# Patient Record
Sex: MALE | Race: BLACK OR AFRICAN AMERICAN | Employment: UNEMPLOYED | URBAN - METROPOLITAN AREA
[De-identification: names, ages, dates, MRNs, and addresses within clinical notes are randomized per-mention and may not be internally consistent; named-entity substitution may affect disease eponyms.]

---

## 2018-05-18 ENCOUNTER — HOSPITAL ENCOUNTER (EMERGENCY)
Facility: CLINIC | Age: 23
Discharge: HOME | End: 2018-05-18
Attending: EMERGENCY MEDICINE | Admitting: EMERGENCY MEDICINE
Payer: COMMERCIAL

## 2018-05-18 ENCOUNTER — APPOINTMENT (OUTPATIENT)
Dept: GENERAL RADIOLOGY | Facility: CLINIC | Age: 23
End: 2018-05-18
Attending: EMERGENCY MEDICINE
Payer: COMMERCIAL

## 2018-05-18 ENCOUNTER — APPOINTMENT (OUTPATIENT)
Dept: CT IMAGING | Facility: CLINIC | Age: 23
End: 2018-05-18
Attending: EMERGENCY MEDICINE
Payer: COMMERCIAL

## 2018-05-18 VITALS
SYSTOLIC BLOOD PRESSURE: 138 MMHG | RESPIRATION RATE: 16 BRPM | OXYGEN SATURATION: 98 % | DIASTOLIC BLOOD PRESSURE: 57 MMHG | HEART RATE: 89 BPM | TEMPERATURE: 99.3 F | WEIGHT: 166.13 LBS

## 2018-05-18 DIAGNOSIS — T07.XXXA MULTIPLE CONTUSIONS: ICD-10-CM

## 2018-05-18 DIAGNOSIS — S00.03XA HEMATOMA OF SCALP, INITIAL ENCOUNTER: ICD-10-CM

## 2018-05-18 DIAGNOSIS — S63.642A SPRAIN OF METACARPOPHALANGEAL (MCP) JOINT OF LEFT THUMB, INITIAL ENCOUNTER: ICD-10-CM

## 2018-05-18 PROCEDURE — 99284 EMERGENCY DEPT VISIT MOD MDM: CPT | Mod: Z6 | Performed by: EMERGENCY MEDICINE

## 2018-05-18 PROCEDURE — 73130 X-RAY EXAM OF HAND: CPT | Mod: LT

## 2018-05-18 PROCEDURE — 99284 EMERGENCY DEPT VISIT MOD MDM: CPT | Mod: 25

## 2018-05-18 PROCEDURE — 29125 APPL SHORT ARM SPLINT STATIC: CPT

## 2018-05-18 PROCEDURE — 70450 CT HEAD/BRAIN W/O DYE: CPT

## 2018-05-18 ASSESSMENT — ENCOUNTER SYMPTOMS
NECK PAIN: 0
MYALGIAS: 1
VOMITING: 0

## 2018-05-18 NOTE — DISCHARGE INSTRUCTIONS
Please make an appointment to follow up with our concussion clinic ([phone]) or Your Primary Care Provider in one week for recheck.    Avoid recurrent head injury including contact sports for 1 week.    Wear the thumb splint for the next 5 days.    Please make an appointment to follow up with Your Primary Care Provider and Sports Medicine (phone: [phone]) to recheck your thumb in 5-7 days unless symptoms completely resolve.

## 2018-05-18 NOTE — ED AVS SNAPSHOT
[facility], [place], Emergency Department    [address]    [place] [address]    Phone:  [phone]    Fax:  [phone]                                       Hernandez Arajuo   MRN: [id number]    Department:  [facility], Emergency Department   Date of Visit:  5/18/2018           After Visit Summary Signature Page     I have received my discharge instructions, and my questions have been answered. I have discussed any challenges I see with this plan with the nurse or doctor.    ..........................................................................................................................................  Patient/Patient Representative Signature      ..........................................................................................................................................  Patient Representative Print Name and Relationship to Patient    ..................................................               ................................................  Date                                            Time    ..........................................................................................................................................  Reviewed by Signature/Title    ...................................................              ..............................................  Date                                                            Time

## 2018-05-18 NOTE — LETTER
May 18, 2018      To Whom It May Concern:      Hernandez Araujo was seen in our Emergency Department today, 05/18/18.  I expect his condition to improve over the next week.  He may return to work/school on 5/19/18 but will have a splint on his left hand hitting its use and should not participate in contact sports for 1 week.    Sincerely,        Dandy Solis MD, MD

## 2018-05-18 NOTE — ED AVS SNAPSHOT
[facility], Emergency Department    [address]    [place] [address]    Phone:  [phone]    Fax:  [phone]                                       Hernandez Araujo   MRN: [id number]    Department:  [facility], Emergency Department   Date of Visit:  5/18/2018           Patient Information     Date Of Birth          1995        Your diagnoses for this visit were:     Hematoma of scalp, initial encounter     Sprain of metacarpophalangeal (MCP) joint of left thumb, initial encounter     Multiple contusions        You were seen by Dandy Solis MD.        Discharge Instructions       Please make an appointment to follow up with our concussion clinic ([phone]) or Your Primary Care Provider in one week for recheck.    Avoid recurrent head injury including contact sports for 1 week.    Wear the thumb splint for the next 5 days.    Please make an appointment to follow up with Your Primary Care Provider and Sports Medicine (phone: [phone]) to recheck your thumb in 5-7 days unless symptoms completely resolve.    Discharge References/Attachments     FINGER SPRAIN (ENGLISH)    SCALP CONTUSION (ENGLISH)      24 Hour Appointment Hotline       To make an appointment at any [facility] clinic, call [phone] ([phone]). If you don't have a family doctor or clinic, we will help you find one. [facility] clinics are conveniently located to serve the needs of you and your family.          ED Discharge Orders     Thumbkeeper                    Review of your medicines      Notice     You have not been prescribed any medications.            Procedures and tests performed during your visit     Head CT w/o contrast    XR Hand Left G/E 3 Views      Orders Needing Specimen Collection     None      Pending Results     Date and Time Order Name Status Description    5/18/2018 1056 Head CT w/o contrast Preliminary             Pending Culture Results     No orders found from  "2018 to 2018.            Pending Results Instructions     If you had any lab results that were not finalized at the time of your Discharge, you can call the ED Lab Result RN at [phone]. You will be contacted by this team for any positive Lab results or changes in treatment. The nurses are available 7 days a week from 10A to 6:30P.  You can leave a message 24 hours per day and they will return your call.        Thank you for choosing [facility]       Thank you for choosing [facility] for your care. Our goal is always to provide you with excellent care. Hearing back from our patients is one way we can continue to improve our services. Please take a few minutes to complete the written survey that you may receive in the mail after you visit with us. Thank you!        [employer] Information     [employer] lets you send messages to your doctor, view your test results, renew your prescriptions, schedule appointments and more. To sign up, go to [email or web address] . Click on \"Log in\" on the left side of the screen, which will take you to the Welcome page. Then click on \"Sign up Now\" on the right side of the page.     You will be asked to enter the access code listed below, as well as some personal information. Please follow the directions to create your username and password.     Your access code is: [email or web address]  Expires: 2018 12:43 PM     Your access code will  in 90 days. If you need help or a new code, please call your [facility] clinic or [phone].        Care EveryWhere ID     This is your Care EveryWhere ID. This could be used by other organizations to access your [facility] medical records  [id number]        Equal Access to Services     CARA DE LEON : shauna Watt, riya zimmerman. So [facility] [phone].    ATENCIÓN: Si habla español, tiene a vo disposición servicios gratuitos de asistencia lingüística. Llame " al [phone].    We comply with applicable federal civil rights laws and [place] laws. We do not discriminate on the basis of race, color, national origin, age, disability, sex, sexual orientation, or gender identity.            After Visit Summary       This is your record. Keep this with you and show to your community pharmacist(s) and doctor(s) at your next visit.
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
## 2019-08-23 ENCOUNTER — TRANSFERRED RECORDS (OUTPATIENT)
Dept: HEALTH INFORMATION MANAGEMENT | Facility: CLINIC | Age: 24
End: 2019-08-23
Payer: COMMERCIAL

## 2019-12-06 ENCOUNTER — TRANSFERRED RECORDS (OUTPATIENT)
Dept: HEALTH INFORMATION MANAGEMENT | Facility: CLINIC | Age: 24
End: 2019-12-06

## 2019-12-23 ENCOUNTER — HOSPITAL ENCOUNTER (EMERGENCY)
Facility: CLINIC | Age: 24
Discharge: HOME | End: 2019-12-23
Attending: EMERGENCY MEDICINE | Admitting: EMERGENCY MEDICINE
Payer: COMMERCIAL

## 2019-12-23 VITALS
OXYGEN SATURATION: 99 % | HEIGHT: 74 IN | BODY MASS INDEX: 20.28 KG/M2 | RESPIRATION RATE: 20 BRPM | HEART RATE: 75 BPM | SYSTOLIC BLOOD PRESSURE: 108 MMHG | DIASTOLIC BLOOD PRESSURE: 71 MMHG | TEMPERATURE: 98.3 F | WEIGHT: 158 LBS

## 2019-12-23 DIAGNOSIS — F41.0 PANIC ATTACK: ICD-10-CM

## 2019-12-23 LAB — INTERPRETATION ECG - MUSE: NORMAL

## 2019-12-23 PROCEDURE — 93005 ELECTROCARDIOGRAM TRACING: CPT

## 2019-12-23 PROCEDURE — 99283 EMERGENCY DEPT VISIT LOW MDM: CPT

## 2019-12-23 PROCEDURE — 25000132 ZZH RX MED GY IP 250 OP 250 PS 637: Performed by: EMERGENCY MEDICINE

## 2019-12-23 RX ORDER — ALPRAZOLAM 0.5 MG
0.5 TABLET ORAL 3 TIMES DAILY PRN
Qty: 12 TABLET | Refills: 0 | Status: SHIPPED | OUTPATIENT
Start: 2019-12-23 | End: 2020-01-02

## 2019-12-23 RX ORDER — ALPRAZOLAM 0.25 MG
1 TABLET ORAL ONCE
Status: COMPLETED | OUTPATIENT
Start: 2019-12-23 | End: 2019-12-23

## 2019-12-23 RX ADMIN — ALPRAZOLAM 1 MG: 0.25 TABLET ORAL at 18:55

## 2019-12-23 ASSESSMENT — MIFFLIN-ST. JEOR: SCORE: 1771.43

## 2019-12-23 ASSESSMENT — ENCOUNTER SYMPTOMS
COUGH: 0
NAUSEA: 0
ABDOMINAL PAIN: 0
SHORTNESS OF BREATH: 1
NERVOUS/ANXIOUS: 1
FEVER: 0
VOMITING: 0
DIARRHEA: 0
TREMORS: 1
HEADACHES: 0

## 2019-12-23 NOTE — ED AVS SNAPSHOT
Emergency Department  [address] [address]  Phone:  [phone]  Fax:  [phone]                                    Hernandez Araujo   MRN: [id number]    Department:   Emergency Department   Date of Visit:  12/23/2019           After Visit Summary Signature Page    I have received my discharge instructions, and my questions have been answered. I have discussed any challenges I see with this plan with the nurse or doctor.    ..........................................................................................................................................  Patient/Patient Representative Signature      ..........................................................................................................................................  Patient Representative Print Name and Relationship to Patient    ..................................................               ................................................  Date                                   Time    ..........................................................................................................................................  Reviewed by Signature/Title    ...................................................              ..............................................  Date                                               Time          22EPIC Rev 08/18

## 2019-12-24 NOTE — ED PROVIDER NOTES
History     Chief Complaint:  Chest Pain and Anxiety    HPI  Hernandez Araujo is a 24 year old male with a history of anxiety and depression who presents to the emergency department today for evaluation of chest pain and anxiety. The patient reports a history of panic attacks, noting to have had 3 this past month and was seen at [facility] emergency department as well. About an hour prior to arrival while driving to his friend's house, he began having chest pain, arm/leg numbness, and anxiety. He states that he has been stressed because his friend recently committed suicide. Today he also got into an argument about money which stressed him out as well. Here, patient is hyperventilating with spasming in bilateral hands. The patient admits to marijuana use and last used today. He doesn't have a therapist or primary care provider to follow up with. No suicidal or homicidal ideation. No nausea, vomiting, diarrhea, recent cold, cough, fevers, or any other symptoms.     Allergies:  No known drug allergies    Medications:    The patient is not currently taking any prescribed medications.    Past Medical History:    Anxiety   Asthma  Depression    Past Surgical History:    History reviewed. No pertinent surgical history.    Family History:    History reviewed. No pertinent family history.     Social History:  The patient reports that he has never smoked. He has never used smokeless tobacco. He reports current drug use. Drug: Marijuana. He reports that he does not drink alcohol.   Marital Status: Single    Review of Systems   Constitutional: Negative for fever.   HENT: Negative.    Respiratory: Positive for shortness of breath. Negative for cough.    Cardiovascular: Positive for chest pain.   Gastrointestinal: Negative for abdominal pain, diarrhea, nausea and vomiting.   Neurological: Positive for tremors. Negative for headaches.   Psychiatric/Behavioral: Negative for self-injury and suicidal ideas. The  "patient is nervous/anxious.    All other systems reviewed and are negative.    Physical Exam     Patient Vitals for the past 24 hrs:   BP Temp Temp src Pulse Heart Rate Resp SpO2 Height Weight   12/23/19 1955 108/71 -- -- 75 -- -- -- -- --   12/23/19 1947 -- -- -- 75 -- -- 99 % -- --   12/23/19 1904 -- -- -- -- -- -- 100 % -- --   12/23/19 1900 -- -- -- -- -- -- 100 % -- --   12/23/19 1804 135/70 98.3  F (36.8  C) Oral -- 115 20 100 % 1.88 m (6' 2\") 71.7 kg (158 lb)     Physical Exam  Constitutional: Black male, supine. Hyperventilating.  HENT: No signs of trauma.   Eyes: EOM are normal. Pupils are equal, round, and reactive to light.   Neck: Normal range of motion. No JVD present. No cervical adenopathy.  Cardiovascular: Rapid regular rhythm.  Exam reveals no gallop and no friction rub.    No murmur heard.  Pulmonary/Chest: Bilateral breath sounds normal. No wheezes, rhonchi or rales.  Abdominal: Soft. No tenderness. No rebound or guarding.   Musculoskeletal: No edema. No tenderness. Carpal pedal spasm of both hands and feet.  Lymphadenopathy: No lymphadenopathy.   Neurological: Alert and oriented to person, place, and time. Normal strength. Coordination normal.   Skin: Skin is warm and dry. No rash noted. No erythema.   Psych: No overt psychosis.    Emergency Department Course     ECG:  ECG taken at 1812  Sinus tachycardia. Biatrial enlargement.   Rate 116 bpm. MT interval 162 ms. QRS duration 86 ms. QT/QTc 320/444 ms. P-R-T axes 78 75 58.    Interventions:  1855: Xanax 1mg tablet PO    Emergency Department Course:  Past medical records, nursing notes, and vitals reviewed.  1843: I performed an exam of the patient and obtained history, as documented above. GCS 15.    1932: I rechecked the patient. Findings and plan explained to the Patient. Patient discharged home with instructions regarding supportive care, medications, and reasons to return. The importance of close follow-up was reviewed.     Impression & Plan  "     Medical Decision Making:  Hernandez Araujo is a 24 year old male who, an hour PTA, became very anxious and began hyperventilating. He has done this a few times before. He gets chest pain with this usually. He has had to go to different hospitals. His friend recently  and he also recently got into a significant conversation about money, which upset him. On exam, he is hyperventilating. He is in carpal pedal spasm. EKG shows no acute ischemic changes but there is probable LVH. Patient received 1 mg of Xanax and now is much better. His symptoms have all resolved. This is most consistent with a panic attack. I do not think he is suffering an acute MI. However, given his EKG he should follow up with his primary care provider for this also. I also asked if they would like resources for mental health and they will check with the numbers on their card. I referred them to Dr. Cortez and given him a small prescription for Xanax now to use as needed with instructions not to drive or operate machinery when taking this.     Diagnosis:    ICD-10-CM   1. Panic attack F41.0       Disposition:   Discharged.    Discharge Medications:     Dose / Directions   ALPRAZolam 0.5 MG tablet  Commonly known as:  Xanax      Dose:  0.5 mg  Take 1 tablet (0.5 mg) by mouth 3 times daily as needed for anxiety  Quantity:  12 tablet  Refills:  0         Scribe Disclosure:  I, Yvette Conor, am serving as a scribe at 6:43 PM on 2019 to document services personally performed by Jb Navarrete MD based on my observations and the provider's statements to me.     EMERGENCY DEPARTMENT       Jb Navarrete MD  19 0104

## 2020-01-02 ENCOUNTER — OFFICE VISIT (OUTPATIENT)
Dept: FAMILY MEDICINE | Facility: CLINIC | Age: 25
End: 2020-01-02
Payer: COMMERCIAL

## 2020-01-02 VITALS
BODY MASS INDEX: 20.26 KG/M2 | HEART RATE: 67 BPM | OXYGEN SATURATION: 97 % | WEIGHT: 157.9 LBS | TEMPERATURE: 97.5 F | HEIGHT: 74 IN | SYSTOLIC BLOOD PRESSURE: 104 MMHG | DIASTOLIC BLOOD PRESSURE: 56 MMHG

## 2020-01-02 DIAGNOSIS — F41.0 PANIC ATTACK: ICD-10-CM

## 2020-01-02 DIAGNOSIS — F41.9 ANXIETY: Primary | ICD-10-CM

## 2020-01-02 DIAGNOSIS — Z76.89 ENCOUNTER TO ESTABLISH CARE WITH NEW DOCTOR: ICD-10-CM

## 2020-01-02 PROCEDURE — 99203 OFFICE O/P NEW LOW 30 MIN: CPT | Performed by: INTERNAL MEDICINE

## 2020-01-02 RX ORDER — ALPRAZOLAM 0.5 MG
0.5 TABLET ORAL DAILY PRN
Qty: 30 TABLET | Refills: 1 | Status: SHIPPED | OUTPATIENT
Start: 2020-01-02 | End: 2020-01-23

## 2020-01-02 ASSESSMENT — PATIENT HEALTH QUESTIONNAIRE - PHQ9
SUM OF ALL RESPONSES TO PHQ QUESTIONS 1-9: 24
5. POOR APPETITE OR OVEREATING: NEARLY EVERY DAY

## 2020-01-02 ASSESSMENT — MIFFLIN-ST. JEOR: SCORE: 1770.98

## 2020-01-02 ASSESSMENT — ANXIETY QUESTIONNAIRES
2. NOT BEING ABLE TO STOP OR CONTROL WORRYING: NEARLY EVERY DAY
3. WORRYING TOO MUCH ABOUT DIFFERENT THINGS: NEARLY EVERY DAY
GAD7 TOTAL SCORE: 21
7. FEELING AFRAID AS IF SOMETHING AWFUL MIGHT HAPPEN: NEARLY EVERY DAY
IF YOU CHECKED OFF ANY PROBLEMS ON THIS QUESTIONNAIRE, HOW DIFFICULT HAVE THESE PROBLEMS MADE IT FOR YOU TO DO YOUR WORK, TAKE CARE OF THINGS AT HOME, OR GET ALONG WITH OTHER PEOPLE: EXTREMELY DIFFICULT
1. FEELING NERVOUS, ANXIOUS, OR ON EDGE: NEARLY EVERY DAY
6. BECOMING EASILY ANNOYED OR IRRITABLE: NEARLY EVERY DAY
5. BEING SO RESTLESS THAT IT IS HARD TO SIT STILL: NEARLY EVERY DAY

## 2020-01-02 NOTE — LETTER
[facility]  01/02/20    Patient: Hernandez Araujo  YOB: 1995  Medical Record Number: [id number]  CSN: [id number]                                                                              Non-opioid Controlled Substance Agreement    I understand that my care provider has prescribed a controlled substance to help manage my condition(s). I am taking this medicine to help me function or work. I know this is strong medicine, and that it can cause serious side effects. Controlled substances can be sedating, addicting and may cause a dependency on the drug. They can affect my ability to drive or think, and cause depression. They need to be taken exactly as prescribed. Combining controlled substances with certain medicines or chemicals (such as cocaine, sedatives and tranquilizers, sleeping pills, meth) can be dangerous or even fatal. Also, if I stop controlled substances suddenly, I may have severe withdrawal symptoms.  If not helpful, I may be asked to stop them.    The risks, benefits, and side effects of these medicine(s) were explained to me. I agree that:    1. I will take part in other treatments as advised by my care team. This may be psychiatry or counseling, physical therapy, behavioral therapy, group treatment or a referral to a pain clinic. I will reduce or stop my medicine when my care team tells me to do so.  2. I will take my medicines as prescribed. I will not change the dose or schedule unless my care team tells me to. There will be no refills if I  run out early.   I may be contactedwithout warning and asked to complete a urine drug test or pill count at any time.   3. I will keep all my appointments, and understand this is part of the monitoring of controlled substances. My care team may require an office visit for EVERY controlled substance refill. If I miss appointments or don t follow instructions, my care team may stop my medicine.  4. I will not ask other providers  to prescribe controlled substances, and I will not accept controlled substances from other people. If I need another prescribed controlled substance for a new reason, I will tell my care team within 1 business day.  5. I will use one pharmacy to fill all of my controlled substance prescriptions, and it is up to me to make sure that I do not run out of my medicines on weekends or holidays. If my care team is willing to refill my controlled substance prescription without a visit, I must request refills only during office hours, refills may take up to 3 days to process, and it may take up to 5 to 7 days for my medicine to be mailed and ready at my pharmacy. Prescriptions will not be mailed anywhere except my pharmacy.    6. I am responsible for my prescriptions. If the medicine/prescription is lost or stolen, it will not be replaced. I also agree not to share controlled substance medicines with anyone.              [facility]  01/02/20  Patient:  Hernandez Araujo  YOB: 1995  Medical Record Number: [id number]  CSN: [id number]    7. I agree to not use ANY illegal or recreational drugs. This includes marijuana, cocaine, bath salts or other drugs. I agree not to use alcohol unless my care team says I may. I agree to give urine samples whenever asked. If I don t give a urine sample, the care team may stop my medicine.    8. If I enroll in the [place] Medical Marijuana program, I will tell my care team. I will also sign an agreement to share my medical records with my care team.    9. I will bring in my list of medicines (or my medicine bottles) each time I come to the clinic.   10. I will tell my care team right away if I become pregnant or have a new medical problem treated outside of my regular clinic.  11. I understand that this medicine can affect my thinking and judgment. It may be unsafe for me to drive, use machinery and do dangerous tasks. I will not do any of these things until I  know how the medicine affects me. If my dose changes, I will wait to see how it affects me. I will contact my care team if I have concerns about medicine side effects.    I understand that if I do not follow any of the conditions above, my prescriptions or treatment may be stopped.      I agree that my provider, clinic care team, and pharmacy may work with any city, state or federal law enforcement agency that investigates the misuse, sale, or other diversion of my controlled medicine. I will allow my provider to discuss my care with or share a copy of this agreement with any other treating provider, pharmacy or emergency room where I receive care. I agree to give up (waive) any right of privacy or confidentiality with respect to these consents.   I have read this agreement and have asked questions about anything I did not understand.    ____________________________________________________    ____________  ________  Patient signature                                                         Date      Time    ____________________________________________________     ____________  ________  Witness                                                          Date      Time    ____________________________________________________  Provider signature

## 2020-01-02 NOTE — PROGRESS NOTES
Chief Complaint:       Hernandez Aruajo is a 24 year old male who presents to clinic today for the following health issues:      New Patient/Transfer of Care  ED/UC Followup:    Facility:   ED   Date of visit: 12/23/2019  Reason for visit: depression and anxiety   Current Status: anxiety and depression symptoms are still not well controlled.         HPI:   Patient Hernandez Araujo is a very pleasant 24 year old male with history of depression, anxiety who presents to Internal Medicine clinic today for evaluation of poorly controlled chronic depression and anxiety symptoms. Regarding the patient's anxiety symptoms, the patient requests a refill of his low dose xanax medication which helps with his anxiety and panic attack symptoms. He was unable to tolerate serotonin specific reuptake inhibitor medications in the past due to worsening depression symptoms. He is also interested in a mental health clinic referral at this time for psychiatry evaluation of his chronic depression and anxiety going forward. No acute suicidal ideations at this time. No chest pain, headaches, fever or chills.          Current Medications:     Current Outpatient Medications   Medication Sig Dispense Refill     ALPRAZolam (XANAX) 0.5 MG tablet Take 1 tablet (0.5 mg) by mouth daily as needed for anxiety 30 tablet 1         Allergies:      Allergies   Allergen Reactions     Peanuts [Nuts] Shortness Of Breath            Past Medical History:     Past Medical History:   Diagnosis Date     Anxiety      Asthma      Depressive disorder          Past Surgical History:   No past surgical history on file.      Family Medical History:     Family History   Problem Relation Age of Onset     Anxiety Disorder Mother          Social History:     Social History     Socioeconomic History     Marital status: Single     Spouse name: Not on file     Number of children: Not on file     Years of education: Not on file     Highest education level:  Not on file   Occupational History     Not on file   Social Needs     Financial resource strain: Not on file     Food insecurity:     Worry: Not on file     Inability: Not on file     Transportation needs:     Medical: Not on file     Non-medical: Not on file   Tobacco Use     Smoking status: Never Smoker     Smokeless tobacco: Never Used   Substance and Sexual Activity     Alcohol use: No     Drug use: Yes     Types: Marijuana     Sexual activity: Yes     Partners: Female   Lifestyle     Physical activity:     Days per week: Not on file     Minutes per session: Not on file     Stress: Not on file   Relationships     Social connections:     Talks on phone: Not on file     Gets together: Not on file     Attends [employer] service: Not on file     Active member of club or organization: Not on file     Attends meetings of clubs or organizations: Not on file     Relationship status: Not on file     Intimate partner violence:     Fear of current or ex partner: Not on file     Emotionally abused: Not on file     Physically abused: Not on file     Forced sexual activity: Not on file   Other Topics Concern     Not on file   Social History Narrative     Not on file           Review of System:     Constitutional: Negative for fever or chills  Skin: Negative for rashes  Ears/Nose/Throat: Negative for nasal congestion, sore throat  Respiratory: No shortness of breath, dyspnea on exertion, cough, or hemoptysis  Cardiovascular: Negative for chest pain  Gastrointestinal: Negative for nausea, vomiting  Genitourinary: Negative for dysuria, hematuria  Musculoskeletal: Negative for myalgias  Neurologic: Negative for headaches  Psychiatric: positive for depression, anxiety  Hematologic/Lymphatic/Immunologic: Negative  Endocrine: Negative  Behavioral: Negative for tobacco use       Physical Exam:   /56 (BP Location: Left arm, Patient Position: Sitting, Cuff Size: Adult Regular)   Pulse 67   Temp 97.5  F (36.4  C) (Oral)   Ht  "1.88 m (6' 2\")   Wt 71.6 kg (157 lb 14.4 oz)   SpO2 97%   BMI 20.27 kg/m      GENERAL: alert and no distress  EYES: eyes grossly normal to inspection, and conjunctivae and sclerae normal  HENT: Normocephalic atraumatic. Nose and mouth without ulcers or lesions  NECK: supple  RESP: lungs clear to auscultation   CV: regular rate and rhythm, normal S1 S2  LYMPH: no peripheral edema   ABDOMEN: nondistended  MS: no gross musculoskeletal defects noted  SKIN: no suspicious lesions or rashes  NEURO: Alert & Oriented x 3.   PSYCH: mentation appears normal, anxious affect with no acute suicidal ideations        Diagnostic Test Results:     PHQ-9 score:    PHQ-9 SCORE 1/2/2020   PHQ-9 Total Score 24         ANDRESSA-7 SCORE 1/2/2020   Total Score 21           ASSESSMENT/PLAN:   (Z76.89) Encounter to establish care with new doctor  (F41.0) Panic attack  (F41.9) Anxiety  (primary encounter diagnosis)  Comment: poorly controlled anxiety and depression symptoms, no acute suicidal ideations.  Plan: ALPRAZolam (XANAX) 0.5 MG tablet, MENTAL HEALTH        REFERRAL  - Adult; Psychiatry and Medication         Management; Psychiatry; [facility]        Psychiatry Service [phone].  Medication         management & future refills will be returned to        G PCP upon completion of evaluation; Malachi galo..., MENTAL HEALTH REFERRAL  - Adult;         Psychiatry and Medication Management;         Psychiatry; [facility]: Psychiatry Clinic [phone]; We will contact you to schedule the         appointment or please call with any questions              Follow Up Plan:     Patient is instructed to return to Internal Medicine clinic for follow-up visit in 1 month.        Elizabeth Grace MD  Internal Medicine  [facility]    "

## 2020-01-03 ASSESSMENT — ANXIETY QUESTIONNAIRES: GAD7 TOTAL SCORE: 21

## 2020-01-21 ENCOUNTER — TELEPHONE (OUTPATIENT)
Dept: FAMILY MEDICINE | Facility: CLINIC | Age: 25
End: 2020-01-21

## 2020-01-21 DIAGNOSIS — F41.9 ANXIETY: ICD-10-CM

## 2020-01-21 DIAGNOSIS — F41.0 PANIC ATTACK: ICD-10-CM

## 2020-01-21 NOTE — TELEPHONE ENCOUNTER
Reason for Call:  Medication or medication refill:    Do you use a [facility]?  Name of the pharmacy and phone number for the current request:     [id number] - [place], [place] - [address] AT [address]      Name of the medication requested: ALPRAZolam (XANAX) 0.5 MG tablet     Other request: requesting for flying     Can we leave a detailed message on this number? YES    Phone number patient can be reached at: Home number on file [phone] (home)    Best Time: any    Call taken on 1/21/2020 at 3:56 PM by Kriss Bateman

## 2020-01-21 NOTE — TELEPHONE ENCOUNTER
Xanax      Last Written Prescription Date:  01/02/2020  Last Fill Quantity: 30,   # refills: 1  Last Office Visit: 01/02/2020  Future Office visit:       Routing refill request to provider for review/approval because:  Drug not on the [facility], [facility] or [facility] refill protocol or controlled substance    Katherine Romero MA

## 2020-01-23 PROBLEM — F41.9 ANXIETY: Status: ACTIVE | Noted: 2020-01-23

## 2020-01-23 RX ORDER — ALPRAZOLAM 0.5 MG
0.5 TABLET ORAL DAILY PRN
Qty: 30 TABLET | Refills: 1 | Status: SHIPPED | OUTPATIENT
Start: 2020-01-23 | End: 2020-01-24

## 2020-01-23 NOTE — TELEPHONE ENCOUNTER
Pt calling to report the pharmacy will not let him refill this rx since the instructions say to take one a day.  He should have pills left over from an earlier rx, but due to increased anxiety attacks he had to take more than one a day.  Pt needs to fill this as he is out of medication

## 2020-01-23 NOTE — TELEPHONE ENCOUNTER
TO PCP:     Steven in [place] has refill available     HOWEVER - request is for [place] pharmacy     please advise   Kandice ALCAZAR RN

## 2020-01-24 ENCOUNTER — NURSE TRIAGE (OUTPATIENT)
Dept: NURSING | Facility: CLINIC | Age: 25
End: 2020-01-24

## 2020-01-24 RX ORDER — ALPRAZOLAM 0.5 MG
0.5 TABLET ORAL 2 TIMES DAILY PRN
Qty: 20 TABLET | Refills: 0 | Status: SHIPPED | OUTPATIENT
Start: 2020-01-24 | End: 2020-01-29

## 2020-01-25 NOTE — TELEPHONE ENCOUNTER
"  Patient states\" I am in [place] and I need you to call the pharmacy here to tell them it's ok to get my RX for   ALPRAZolam (XANAX) 0.5 MG tablet 20 tablet 0 1/24/2020  No   Sig - Route: Take 1 tablet (0.5 mg) by mouth 2 times daily as needed for anxiety - Oral     Filled early. They are saying it's too early and they can't fill it.\" I spoke with the pharmacists and he informed me that the patient received #30 on 1/2/20 and with those directions it is too early to fill. He should still have a few left. He told me he was taking more than he was supposed to take due to sx\".I can only see RX per epic from above. I can not over ride RX orders either. Pt will need to go to ER if he's out of his medication, or wait until he can fill per protocol.  Whitney De La Rosa RN [facility]      Reason for Disposition    Pharmacy calling with prescription question and triager answers question    Protocols used: MEDICATION QUESTION CALL-A-AH      "

## 2020-01-29 ENCOUNTER — OFFICE VISIT (OUTPATIENT)
Dept: FAMILY MEDICINE | Facility: CLINIC | Age: 25
End: 2020-01-29
Payer: COMMERCIAL

## 2020-01-29 VITALS
WEIGHT: 160 LBS | HEART RATE: 76 BPM | TEMPERATURE: 98.1 F | OXYGEN SATURATION: 97 % | SYSTOLIC BLOOD PRESSURE: 114 MMHG | DIASTOLIC BLOOD PRESSURE: 73 MMHG | HEIGHT: 74 IN | BODY MASS INDEX: 20.53 KG/M2

## 2020-01-29 DIAGNOSIS — F41.9 ANXIETY: ICD-10-CM

## 2020-01-29 DIAGNOSIS — F41.0 PANIC ATTACK: ICD-10-CM

## 2020-01-29 DIAGNOSIS — F33.9 EPISODE OF RECURRENT MAJOR DEPRESSIVE DISORDER, UNSPECIFIED DEPRESSION EPISODE SEVERITY (H): Primary | ICD-10-CM

## 2020-01-29 PROCEDURE — 99214 OFFICE O/P EST MOD 30 MIN: CPT | Performed by: INTERNAL MEDICINE

## 2020-01-29 RX ORDER — ALPRAZOLAM 0.5 MG
0.5 TABLET ORAL 2 TIMES DAILY PRN
Qty: 30 TABLET | Refills: 1 | Status: SHIPPED | OUTPATIENT
Start: 2020-01-29 | End: 2020-04-29

## 2020-01-29 ASSESSMENT — PATIENT HEALTH QUESTIONNAIRE - PHQ9
SUM OF ALL RESPONSES TO PHQ QUESTIONS 1-9: 14
5. POOR APPETITE OR OVEREATING: NEARLY EVERY DAY

## 2020-01-29 ASSESSMENT — ANXIETY QUESTIONNAIRES
IF YOU CHECKED OFF ANY PROBLEMS ON THIS QUESTIONNAIRE, HOW DIFFICULT HAVE THESE PROBLEMS MADE IT FOR YOU TO DO YOUR WORK, TAKE CARE OF THINGS AT HOME, OR GET ALONG WITH OTHER PEOPLE: EXTREMELY DIFFICULT
7. FEELING AFRAID AS IF SOMETHING AWFUL MIGHT HAPPEN: NEARLY EVERY DAY
GAD7 TOTAL SCORE: 21
6. BECOMING EASILY ANNOYED OR IRRITABLE: NEARLY EVERY DAY
2. NOT BEING ABLE TO STOP OR CONTROL WORRYING: NEARLY EVERY DAY
1. FEELING NERVOUS, ANXIOUS, OR ON EDGE: NEARLY EVERY DAY
3. WORRYING TOO MUCH ABOUT DIFFERENT THINGS: NEARLY EVERY DAY
5. BEING SO RESTLESS THAT IT IS HARD TO SIT STILL: NEARLY EVERY DAY

## 2020-01-29 ASSESSMENT — MIFFLIN-ST. JEOR: SCORE: 1780.51

## 2020-01-29 NOTE — PROGRESS NOTES
Chief Complaint:       Hernandez Araujo is a 24 year old male who presents to clinic today for the following health issues:      Depression and Anxiety Follow-Up      Social History     Tobacco Use     Smoking status: Never Smoker     Smokeless tobacco: Never Used   Substance Use Topics     Alcohol use: No     Drug use: Yes     Types: Marijuana     PHQ 1/2/2020 1/29/2020   PHQ-9 Total Score 24 14   Q9: Thoughts of better off dead/self-harm past 2 weeks Not at all Not at all   PHQ-A Suicide Ideation past 2 weeks - Nearly every day     ANDRESSA-7 SCORE 1/2/2020 1/29/2020   Total Score 21 21     Last PHQ-9 1/29/2020   1.  Little interest or pleasure in doing things 2   2.  Feeling down, depressed, or hopeless 2   3.  Trouble falling or staying asleep, or sleeping too much 3   4.  Feeling tired or having little energy 2   5.  Poor appetite or overeating 2   6.  Feeling bad about yourself 3   7.  Trouble concentrating 0   8.  Moving slowly or restless 0   Q9: Thoughts of better off dead/self-harm past 2 weeks 0   PHQ-9 Total Score 14   Difficulty at work, home, or with people Extremely dIfficult     ANDRESSA-7  1/29/2020   1. Feeling nervous, anxious, or on edge 3   2. Not being able to stop or control worrying 3   3. Worrying too much about different things 3   4. Trouble relaxing 3   5. Being so restless that it is hard to sit still 3   6. Becoming easily annoyed or irritable 3   7. Feeling afraid, as if something awful might happen 3   ANDRESSA-7 Total Score 21   If you checked any problems, how difficult have they made it for you to do your work, take care of things at home, or get along with other people? Extremely difficult         Current Medications:     Current Outpatient Medications   Medication Sig Dispense Refill     ALPRAZolam (XANAX) 0.5 MG tablet Take 1 tablet (0.5 mg) by mouth 2 times daily as needed for anxiety 30 tablet 1         Allergies:      Allergies   Allergen Reactions     Peanuts [Nuts] Shortness Of  Breath            Past Medical History:     Past Medical History:   Diagnosis Date     Anxiety      Asthma      Depressive disorder          Past Surgical History:   No past surgical history on file.      Family Medical History:     Family History   Problem Relation Age of Onset     Anxiety Disorder Mother          Social History:     Social History     Socioeconomic History     Marital status: Single     Spouse name: Not on file     Number of children: Not on file     Years of education: Not on file     Highest education level: Not on file   Occupational History     Not on file   Social Needs     Financial resource strain: Not on file     Food insecurity:     Worry: Not on file     Inability: Not on file     Transportation needs:     Medical: Not on file     Non-medical: Not on file   Tobacco Use     Smoking status: Never Smoker     Smokeless tobacco: Never Used   Substance and Sexual Activity     Alcohol use: No     Drug use: Yes     Types: Marijuana     Sexual activity: Yes     Partners: Female   Lifestyle     Physical activity:     Days per week: Not on file     Minutes per session: Not on file     Stress: Not on file   Relationships     Social connections:     Talks on phone: Not on file     Gets together: Not on file     Attends [employer] service: Not on file     Active member of club or organization: Not on file     Attends meetings of clubs or organizations: Not on file     Relationship status: Not on file     Intimate partner violence:     Fear of current or ex partner: Not on file     Emotionally abused: Not on file     Physically abused: Not on file     Forced sexual activity: Not on file   Other Topics Concern     Not on file   Social History Narrative     Not on file           Review of System:     Constitutional: Negative for fever or chills  Skin: Negative for rashes  Ears/Nose/Throat: Negative for nasal congestion, sore throat  Respiratory: No shortness of breath, dyspnea on exertion, cough, or  "hemoptysis  Cardiovascular: Negative for chest pain  Gastrointestinal: Negative for nausea, vomiting  Genitourinary: Negative for dysuria, hematuria  Musculoskeletal: Negative for myalgias  Neurologic: Negative for headaches  Psychiatric: positive for depression, anxiety  Hematologic/Lymphatic/Immunologic: Negative  Endocrine: Negative  Behavioral: Negative for tobacco use       Physical Exam:   /73 (BP Location: Left arm, Patient Position: Sitting, Cuff Size: Adult Regular)   Pulse 76   Temp 98.1  F (36.7  C) (Oral)   Ht 1.88 m (6' 2\")   Wt 72.6 kg (160 lb)   SpO2 97%   BMI 20.54 kg/m      GENERAL: alert and no distress  EYES: eyes grossly normal to inspection, and conjunctivae and sclerae normal  HENT: Normocephalic atraumatic. Nose and mouth without ulcers or lesions  NECK: supple  RESP: lungs clear to auscultation   CV: regular rate and rhythm, normal S1 S2  LYMPH: no peripheral edema   ABDOMEN: nondistended  MS: no gross musculoskeletal defects noted  SKIN: no suspicious lesions or rashes  NEURO: Alert & Oriented x 3.   PSYCH: mentation appears normal, anxious affect with no acute suicidal ideations        Diagnostic Test Results:     PHQ-9 score:    PHQ-9 SCORE 1/29/2020   PHQ-9 Total Score 14         ANDRESSA-7 SCORE 1/2/2020 1/29/2020   Total Score 21 21           ASSESSMENT/PLAN:   (F33.9) Episode of recurrent major depressive disorder, unspecified depression episode severity (H)  (primary encounter diagnosis)  (F41.0) Panic attack  (F41.9) Anxiety  (primary encounter diagnosis)  Comment: poorly controlled anxiety and depression symptoms, no acute suicidal ideations.  Plan: ALPRAZolam (XANAX) 0.5 MG tablet, continue MENTAL HEALTH REFERRAL  - Adult; Psychiatry and Medication Management; Psychiatry; G: [facility]        Psychiatry Service [phone].  Medication         management & future refills will be returned to        G PCP upon completion of evaluation; We         iraj..., MENTAL " HEALTH REFERRAL  - Adult;         Psychiatry and Medication Management;         Psychiatry; [facility]: Psychiatry Clinic [phone]; We will contact you to schedule the         appointment or please call with any questions              Follow Up Plan:     Patient is instructed to return to Internal Medicine clinic for follow-up visit in 1 month.        Elizabeth Grace MD  Internal Medicine  [facility]

## 2020-01-30 ASSESSMENT — ANXIETY QUESTIONNAIRES: GAD7 TOTAL SCORE: 21

## 2020-04-15 ENCOUNTER — TELEPHONE (OUTPATIENT)
Dept: PSYCHIATRY | Facility: CLINIC | Age: 25
End: 2020-04-15

## 2020-04-15 NOTE — TELEPHONE ENCOUNTER
PSYCHIATRY CLINIC PHONE INTAKE     SERVICES REQUESTED / INTERESTED IN          Med Management, therapy    Presenting Problem and Brief History                              What would you like to be seen for? (brief description):  Patient has had panic attacks since age 11 but lately his body has been going numb to the point someone else has to give him his PRN medication. Patient's friend committed suicide 9 months ago and while he was driving to the , his arm went numb and he thought he was having a heart attack. Patient was brought to the ER after calling an ambulance. Patient reports it can take up to 45 mins to an hour before he is able to move his limbs again.  This occurs multiple times per week. Patient tries holistic solutions such as yoga and meditation because medication has caused suicidal ideation in the past. 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  Have you received a mental health diagnosis? Yes   Which one (s): Anxiety, Depression, Panic attacks  Is there any history of developmental delay?  No   Are you currently seeing a mental health provider?  No            Who / month last seen:  Therapy in [facility], Ankit Ramirez  Do you have mental health records elsewhere?  Yes  Will you sign a release so we can obtain them?  Yes    Have you ever been hospitalized for psychiatric reasons?  Yes  Describe:  4 times, most recently /2020    Do you have current thoughts of self-harm?  No    Do you currently have thoughts of harming others?  No       Substance Use History     Do you have any history of alcohol / illicit drug use?  No  Describe:    Have you ever received treatment for this?  No    Describe:       Social History     Who is the patient's a guardian?  No    Name / number:   Have you had an ACT team in last 12 months?  No  Describe:    Do you have any current or past legal issues?  No   Describe:    OK to leave a detailed voicemail?  Yes    Medical/ Surgical History                                   Patient Active Problem List   Diagnosis     Anxiety          Medications             Current Outpatient Medications   Medication Sig Dispense Refill     ALPRAZolam (XANAX) 0.5 MG tablet Take 1 tablet (0.5 mg) by mouth 2 times daily as needed for anxiety 30 tablet 1         DISPOSITION      Completed phone screen with patient. Scheduled RABT with Vianney Talbot on 4/22/20 and Dr. Pak on 4/29/20.    Monisha Moore,

## 2020-04-22 ENCOUNTER — VIRTUAL VISIT (OUTPATIENT)
Dept: PSYCHIATRY | Facility: CLINIC | Age: 25
End: 2020-04-22
Attending: SOCIAL WORKER
Payer: COMMERCIAL

## 2020-04-22 DIAGNOSIS — F33.1 MAJOR DEPRESSIVE DISORDER, RECURRENT EPISODE, MODERATE (H): ICD-10-CM

## 2020-04-22 DIAGNOSIS — F43.10 PTSD (POST-TRAUMATIC STRESS DISORDER): Primary | ICD-10-CM
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
## 2020-04-29 ENCOUNTER — TELEPHONE (OUTPATIENT)
Dept: PSYCHIATRY | Facility: CLINIC | Age: 25
End: 2020-04-29

## 2020-04-29 ENCOUNTER — VIRTUAL VISIT (OUTPATIENT)
Dept: PSYCHIATRY | Facility: CLINIC | Age: 25
End: 2020-04-29
Attending: PSYCHIATRY & NEUROLOGY
Payer: COMMERCIAL

## 2020-04-29 DIAGNOSIS — F41.0 PANIC ATTACK: ICD-10-CM

## 2020-04-29 DIAGNOSIS — F33.1 MODERATE EPISODE OF RECURRENT MAJOR DEPRESSIVE DISORDER (H): ICD-10-CM

## 2020-04-29 DIAGNOSIS — F43.10 PTSD (POST-TRAUMATIC STRESS DISORDER): Primary | ICD-10-CM

## 2020-04-29 DIAGNOSIS — F41.9 ANXIETY: ICD-10-CM

## 2020-04-29 RX ORDER — DIAZEPAM 10 MG
10 TABLET ORAL DAILY PRN
Qty: 20 TABLET | Refills: 0 | Status: SHIPPED | OUTPATIENT
Start: 2020-04-29 | End: 2020-06-24
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
## 2020-04-29 NOTE — TELEPHONE ENCOUNTER
On 4/29/2020, at 1:10, writer called patient at [phone] to confirm Virtual Visit. Writer unable to make contact with patient. Writer could not leave voivemail because  Voicemail box was full.   Alicia Hagen, [facility]

## 2020-05-18 ENCOUNTER — TELEPHONE (OUTPATIENT)
Dept: PSYCHIATRY | Facility: CLINIC | Age: 25
End: 2020-05-18

## 2020-05-18 NOTE — TELEPHONE ENCOUNTER
On 5/18/2020, at 8:15, writer called patient at [phone] to confirm Virtual Visit. Writer unable to make contact with patient. Writer left detailed voice message for call back. [phone] left as call back number. Alicia Hagen, [facility]

## 2020-05-18 NOTE — PATIENT INSTRUCTIONS
Thank you for coming to the PSYCHIATRY CLINIC.    Lab Testing:  If you had lab testing today and your results are reassuring or normal they will be mailed to you or sent through [facility] within 7 days. If the lab tests need quick action we will call you with the results. The phone number we will call with results is # [phone] (home) . If this is not the best number please call our clinic and change the number.    Medication Refills:  If you need any refills please call your pharmacy and they will contact us. Our fax number for refills is [phone]. Please allow three business for refill processing. If you need to  your refill at a new pharmacy, please contact the new pharmacy directly. The new pharmacy will help you get your medications transferred.     Scheduling:  If you have any concerns about today's visit or wish to schedule another appointment please call our office during normal business hours [phone] (8-5:00 M-F)    Contact Us:  Please call [phone] during business hours (8-5:00 M-F).  If after clinic hours, or on the weekend, please call  [phone].    Financial Assistance [phone]  [facility] Billing [phone]  [place] Billing Office, [facility]: [phone]  [place] Billing [phone]  Medical Records [phone]    MENTAL HEALTH CRISIS NUMBERS:  For a medical emergency please call  911 or go to the nearest ER.     [facility]:   [facility] -[phone]   Crisis Residence [facility] -[phone]   Walk-In Counseling Center [facility] -[phone]   COPE 24/7 [place] Mobile Team -[phone] (adults)/[phone] (child)     [facility]:   [facility] - [phone]   Walk-in counseling [facility] - [phone]   Walk-in counseling [facility] - [phone]   Crisis Residence [facility] - [phone]   Urgent Care Adult Mental [facility]-[phone] mobile unit/ 24/7 crisis  line    Other Crisis Numbers:   - National Suicide Prevention Lifeline: 2-328-373-TALK (296-705-8607)  - Poison Control Center - 1-928.770.2803  - CHILD: [facility] needs assessment team - [phone]   - Trans Lifeline - 4-943-540-9165; [place] [facility] Lifeline - 4-330-657-8922  - [employer]/resources for a list of additional resources    OR  If making a call doesn't work, try sending a text to-  CRISIS TEXT LINE: For any crisis 24/7    To: 307089   see www.crisistextline.org   ----------------------------------------------------------------------    Again thank you for choosing PSYCHIATRY CLINIC and please let us know how we can best partner with you to improve you and your family's health.    You may be receiving a survey regarding this appointment. We would love to have your feedback, both positive and negative. The survey is done by an external company, so your answers are anonymous.

## 2020-05-19 ENCOUNTER — VIRTUAL VISIT (OUTPATIENT)
Dept: PSYCHIATRY | Facility: CLINIC | Age: 25
End: 2020-05-19
Attending: PSYCHOLOGIST
Payer: COMMERCIAL

## 2020-05-19 DIAGNOSIS — F43.10 PTSD (POST-TRAUMATIC STRESS DISORDER): Primary | ICD-10-CM

## 2020-05-26 ENCOUNTER — VIRTUAL VISIT (OUTPATIENT)
Dept: PSYCHIATRY | Facility: CLINIC | Age: 25
End: 2020-05-26
Attending: PSYCHOLOGIST
Payer: COMMERCIAL

## 2020-05-26 DIAGNOSIS — F43.10 PTSD (POST-TRAUMATIC STRESS DISORDER): Primary | ICD-10-CM
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
## 2020-06-05 ENCOUNTER — TELEPHONE (OUTPATIENT)
Dept: PSYCHIATRY | Facility: CLINIC | Age: 25
End: 2020-06-05

## 2020-06-18 ENCOUNTER — VIRTUAL VISIT (OUTPATIENT)
Dept: PSYCHIATRY | Facility: CLINIC | Age: 25
End: 2020-06-18
Attending: PSYCHOLOGIST
Payer: COMMERCIAL

## 2020-06-18 DIAGNOSIS — F43.10 PTSD (POST-TRAUMATIC STRESS DISORDER): Primary | ICD-10-CM
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
## 2020-06-24 ENCOUNTER — TELEPHONE (OUTPATIENT)
Dept: PSYCHIATRY | Facility: CLINIC | Age: 25
End: 2020-06-24

## 2020-06-24 ENCOUNTER — TELEPHONE (OUTPATIENT)
Dept: BEHAVIORAL HEALTH | Facility: CLINIC | Age: 25
End: 2020-06-24

## 2020-06-24 DIAGNOSIS — F41.0 PANIC ATTACK: ICD-10-CM

## 2020-06-24 RX ORDER — DIAZEPAM 5 MG
5-10 TABLET ORAL DAILY PRN
Qty: 30 TABLET | Refills: 0 | Status: SHIPPED | OUTPATIENT
Start: 2020-06-24 | End: 2020-07-15

## 2020-06-24 NOTE — TELEPHONE ENCOUNTER
"Telephone Encounter    I was notified by intake that patient requested callback.  I spoke with patient at 6:05pm.     Patient explained that he has severe anxiety disorder and panic attacks that cause \"seizures\".  He states he needs a refill of his Diazepam 10mg. He states he has been out of the medication for 3 days.  I reviewed patient's chart and see he sent a message to Dr. Pak asking for a refill around 5pm today.  Dr. Pak sent a message to his nurse stating he would authorize 1 more refill once patient was scheduled to see him in clinic.  Nurse had attempted to call patient around 5:30pm with no answer and no VM box set up.  Patient then called after hours line to request refill.     I explained to patient that this is a controlled substance and he would need to be scheduled with Dr. Pak in order to get a refill. I recommended he call the clinic tomorrow morning.  Patient was initially cooperative, however once he realized he was not getting a refill tonight he became upset.  He stated, \"this is because I am black, you would not do this to a 39 year old white woman\".  He stated that he doesn't understand why his trauma therapist can't prescribe his medications rather than \"some ofelia that I see for 2 minutes\".  I spent time validating patient's feelings and continued to tell him I could not refill medication tonight. I went over TIPP skills and told him the ED is always available in emergency.  He stated he felt safe.     He ended the phone call stating, \"thanks for this emergency line that does nothing to help people\".  I explained to patient this was not an emergency line.  He hung up.     Dr. Vanessa Raman DO, GRAY, MS  PGY-2 Psychiatry Resident Physician   ---------------------------------------------------------------------------------------    "

## 2020-06-24 NOTE — TELEPHONE ENCOUNTER
Placed phone call to pt in follow-up to his medication refill request. No answer and no VM option. Writer unable to leave VM. Will attempt to reach pt again tomorrow.

## 2020-06-24 NOTE — TELEPHONE ENCOUNTER
----- Message from Jakub Pak MD sent at 6/24/2020  5:00 PM CDT -----  [facility], this patient emailed me requesting refill on diazepam. Can you call him and let him know that I can provide 1 more refill once he is scheduled with me? Also, please let him know that his 3 months in the [facility] program is up on 7/29, so we don't have a lot of time left to figure out medication options.

## 2020-06-24 NOTE — TELEPHONE ENCOUNTER
537pm - Pt called reporting that he is in need of a medication refill, would like to speak with on call resident in regards to it.   549pm - On call resident notified

## 2020-06-25 ENCOUNTER — VIRTUAL VISIT (OUTPATIENT)
Dept: PSYCHIATRY | Facility: CLINIC | Age: 25
End: 2020-06-25
Attending: PSYCHOLOGIST
Payer: COMMERCIAL

## 2020-06-25 DIAGNOSIS — F43.10 PTSD (POST-TRAUMATIC STRESS DISORDER): Primary | ICD-10-CM

## 2020-06-25 NOTE — TELEPHONE ENCOUNTER
"--A refill of #30 diazepam 5mg tablets was submitted 6/24/20 to pt's preferred pharmacy by Dr. Pak.  --Per Dr. Pak: \"I still need him to schedule, and I need him to understand that he wont' get another prescription until I physically see him (I did write this in the sig), and that his time in the program is up on 7/29. If I don't see him by then, he will have to contact his primary care provider for additional refills.\"  --Spoke with pt. Reviewed standard clinic procedure regarding medication refills. Pt is aware that he will need to see Dr. Pak before an additional refill of medication would be provided. He agreed to an appointment on 7/15 at 10a. He is aware that his time in the [facility] program will be ending as of 7/29 and that if for whatever reason he is unable to see Dr. Pak prior to 7/29, refills will return to his PCP. Pt expressed understanding and agreement with plan. Message sent to scheduling to enter appointment.   "

## 2020-06-25 NOTE — PROGRESS NOTES
[facility] Psychiatry Clinic    Psychotherapy Progress Note     Date: June 25, 2020    Patient name: Hernandez Araujo     Patient MRN: [id number]    Provider: Christine Iyer, PhD LP    Procedure: Individual psychotherapy session    Session length: 5 minutes (start: 1:40, stop: 1:45).    Diagnosis:   Encounter Diagnosis   Name Primary?     PTSD (post-traumatic stress disorder) Yes        Type of service:  video visit for psychotherapy  Reason for video visit:  Patient unable to travel due to Covid-19  Originating Site (patient location):  Patient driving in car.   Distant Site (provider location):  Remote location  Mode of Communication:  Video Conference via AmMister Mario  Consent:  Patient has given verbal consent for video visit?: Yes     The patient arrived for the appointment 10 minutes late and was driving when he answered the video. Writer let the patient know that writer would not do the appointment while he was driving, for safety reasons. 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 He does not have writer's number and does not have an established voicemail, so writer will call and try to reach him in the future to schedule an additional appointment. Session was completed in 5 minutes.

## 2020-06-26 ENCOUNTER — TELEPHONE (OUTPATIENT)
Dept: PSYCHIATRY | Facility: CLINIC | Age: 25
End: 2020-06-26

## 2020-06-26 NOTE — TELEPHONE ENCOUNTER
Writer called patient to reschedule appointment from the previous day but he does not have working voicemail so writer could not leave a message. Writer will make one follow-up attempt to provide patient with writer's number (as he said he does not have it and he could not write it down while he was driving during the previous day's appointment).

## 2020-07-15 ENCOUNTER — TELEPHONE (OUTPATIENT)
Dept: PSYCHIATRY | Facility: CLINIC | Age: 25
End: 2020-07-15

## 2020-07-15 ENCOUNTER — VIRTUAL VISIT (OUTPATIENT)
Dept: PSYCHIATRY | Facility: CLINIC | Age: 25
End: 2020-07-15
Attending: PSYCHIATRY & NEUROLOGY
Payer: COMMERCIAL

## 2020-07-15 DIAGNOSIS — F43.10 PTSD (POST-TRAUMATIC STRESS DISORDER): Primary | ICD-10-CM

## 2020-07-15 DIAGNOSIS — F41.0 PANIC ATTACK: ICD-10-CM

## 2020-07-15 DIAGNOSIS — F33.1 MODERATE EPISODE OF RECURRENT MAJOR DEPRESSIVE DISORDER (H): ICD-10-CM
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
## 2020-07-15 NOTE — Clinical Note
Yifan Johnson, I just wanted to give you the heads up on Will. I saw him for a psych consult and brief care. Personally, I'm not a big fan of Xanax because it tends to cause underlying anxiety to worsen, so I transitioned him to diazepam and talked to him about the importance of capping the use of benzodiazepines to 6 months. Unfortunately he was not so convinced about using SSRIs, which would be the true first line for this. He did have a bad experience with Prozac years ago, so understandable. Anyway, we have finished now. He may still request another month or two of the diazepam, but he is actively working towards being off of it. Just wanted to give you the heads up in case he needs another refill.     Also, I am available for chart review consultation if there are any other issues you want to consult with me on regarding his care. Feel free to message me anytime.   -Daniel

## 2020-07-15 NOTE — TELEPHONE ENCOUNTER
On 7/15/2020, at 0920, writer called patient at mobile to confirm Virtual Visit. Writer unable to make contact with patient. Writer unable to leave message - voice mailbox not set up. KACY Counsell, EMT

## 2020-07-17 RX ORDER — DIAZEPAM 5 MG
5 TABLET ORAL DAILY PRN
Qty: 30 TABLET | Refills: 0 | Status: SHIPPED | OUTPATIENT
Start: 2020-07-17 | End: 2020-10-05 | Stop reason: ALTCHOICE
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
## 2020-09-29 ENCOUNTER — TELEPHONE (OUTPATIENT)
Dept: FAMILY MEDICINE | Facility: CLINIC | Age: 25
End: 2020-09-29

## 2020-09-29 DIAGNOSIS — F41.0 PANIC ATTACK: ICD-10-CM

## 2020-09-29 RX ORDER — DIAZEPAM 5 MG
5 TABLET ORAL DAILY PRN
Qty: 7 TABLET | Refills: 0 | Status: CANCELLED | OUTPATIENT
Start: 2020-09-29

## 2020-09-29 NOTE — TELEPHONE ENCOUNTER
Reason for call:  Patient reporting a symptom    Symptom or request: Patient said he was referred to behavioral health  Early this year by Dr Grace, but that is was temporary and is now supposed to be back with Dr Grace.     His med was changed from Xanax to Valium.  He states he has been out of med for a week.   Patient said he is still having panic attacks, he said  limbs go numb and starts to hyperventilate, body contorts like bones are going to snap.  And it is difficult for attack to pass without medication.    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 I scheduled pt for virtual appointment with Dr Grace for Monday 10/5, pt is wanting to get some medication in the meantime if he can.       Duration (how long have symptoms been present):       Have you been treated for this before? Yes    Additional comments: [id number] - [place], [place] - [address] [facility] [address]     Phone Number patient can be reached at:  Home number on file [phone] (home)    Best Time:  any    Can we leave a detailed message on this number:  YES    Call taken on 9/29/2020 at 1:27 PM by Mariana Guerrero

## 2020-09-29 NOTE — TELEPHONE ENCOUNTER
To PCP:     Pt has a VV scheduled 10/5- he requests refill of Valium to get him through. Can he have a gina refill?     Pended #7 for review, if appropriate.       Renewals     Renewal requests to authorizing provider (Jakub Pak MD) prohibited    Renewal provider: Elizabeth Grace MD        diazepam (VALIUM) 5 MG tablet   Last Written Prescription Date: 7/17/2020  Last Fill Quantity: 30,   # refills: 0  Last Office Visit: 1/29/2020  Future Office visit:       Routing refill request to provider for review/approval because:  Drug not on the [facility], [facility] or  Health refill protocol or controlled substance

## 2020-10-05 ENCOUNTER — VIRTUAL VISIT (OUTPATIENT)
Dept: FAMILY MEDICINE | Facility: CLINIC | Age: 25
End: 2020-10-05
Payer: COMMERCIAL

## 2020-10-05 DIAGNOSIS — J45.20 INTERMITTENT ASTHMA WITHOUT COMPLICATION, UNSPECIFIED ASTHMA SEVERITY: ICD-10-CM

## 2020-10-05 DIAGNOSIS — F41.9 ANXIETY: Primary | ICD-10-CM

## 2020-10-05 DIAGNOSIS — F41.0 PANIC ATTACK: ICD-10-CM

## 2020-10-05 DIAGNOSIS — F33.9 EPISODE OF RECURRENT MAJOR DEPRESSIVE DISORDER, UNSPECIFIED DEPRESSION EPISODE SEVERITY (H): ICD-10-CM

## 2020-10-05 PROCEDURE — 99214 OFFICE O/P EST MOD 30 MIN: CPT | Mod: 95 | Performed by: INTERNAL MEDICINE

## 2020-10-05 RX ORDER — ALPRAZOLAM 0.25 MG
0.25 TABLET ORAL 3 TIMES DAILY PRN
Qty: 60 TABLET | Refills: 1 | Status: SHIPPED | OUTPATIENT
Start: 2020-10-05 | End: 2021-02-12

## 2020-10-05 ASSESSMENT — PATIENT HEALTH QUESTIONNAIRE - PHQ9: SUM OF ALL RESPONSES TO PHQ QUESTIONS 1-9: 0

## 2020-10-05 NOTE — PROGRESS NOTES
"Hernandez Araujo is a 25 year old male who is being evaluated via a billable video visit.      The patient has been notified of following:     \"This video visit will be conducted via a call between you and your physician/provider. We have found that certain health care needs can be provided without the need for an in-person physical exam.  This service lets us provide the care you need with a video conversation.  If a prescription is necessary we can send it directly to your pharmacy.  If lab work is needed we can place an order for that and you can then stop by our lab to have the test done at a later time.    Video visits are billed at different rates depending on your insurance coverage.  Please reach out to your insurance provider with any questions.    If during the course of the call the physician/provider feels a video visit is not appropriate, you will not be charged for this service.\"    Patient has given verbal consent for Video visit? Yes  How would you like to obtain your AVS? Mail a copy  If you are dropped from the video visit, the video invite should be resent to: Text to cell phone: [phone]  Will anyone else be joining your video visit? No    Subjective     Hernandez Araujo is a 25 year old male who presents today via video visit for the following health issues:    HPI         Video Start Time: 2:30 PM    Chief Complaint:       Depression and Anxiety Follow-Up      Social History     Tobacco Use     Smoking status: Never Smoker     Smokeless tobacco: Never Used   Substance Use Topics     Alcohol use: No     Drug use: Yes     Types: Marijuana     PHQ 1/2/2020 1/29/2020 10/5/2020   PHQ-9 Total Score 24 14 0   Q9: Thoughts of better off dead/self-harm past 2 weeks Not at all Not at all Not at all   PHQ-A Suicide Ideation past 2 weeks - Nearly every day -     ANDRESSA-7 SCORE 1/2/2020 1/29/2020   Total Score 21 21     Last PHQ-9 10/5/2020   1.  Little interest or pleasure in doing things " 0   2.  Feeling down, depressed, or hopeless 0   3.  Trouble falling or staying asleep, or sleeping too much 0   4.  Feeling tired or having little energy 0   5.  Poor appetite or overeating 0   6.  Feeling bad about yourself 0   7.  Trouble concentrating 0   8.  Moving slowly or restless 0   Q9: Thoughts of better off dead/self-harm past 2 weeks 0   PHQ-9 Total Score 0   Difficulty at work, home, or with people Not difficult at all     ANDRESSA-7  1/29/2020   1. Feeling nervous, anxious, or on edge 3   2. Not being able to stop or control worrying 3   3. Worrying too much about different things 3   4. Trouble relaxing 3   5. Being so restless that it is hard to sit still 3   6. Becoming easily annoyed or irritable 3   7. Feeling afraid, as if something awful might happen 3   ANDRESSA-7 Total Score 21   If you checked any problems, how difficult have they made it for you to do your work, take care of things at home, or get along with other people? Extremely difficult         Current Medications:     Current Outpatient Medications   Medication Sig Dispense Refill     ALPRAZolam (XANAX) 0.25 MG tablet Take 1 tablet (0.25 mg) by mouth 3 times daily as needed for anxiety 60 tablet 1         Allergies:      Allergies   Allergen Reactions     Peanuts [Nuts] Shortness Of Breath            Past Medical History:     Past Medical History:   Diagnosis Date     Anxiety      Asthma      Depressive disorder          Past Surgical History:   No past surgical history on file.      Family Medical History:     Family History   Problem Relation Age of Onset     Anxiety Disorder Mother          Social History:     Social History     Socioeconomic History     Marital status: Single     Spouse name: Not on file     Number of children: Not on file     Years of education: Not on file     Highest education level: Not on file   Occupational History     Not on file   Social Needs     Financial resource strain: Not on file     Food insecurity     Worry:  Not on file     Inability: Not on file     Transportation needs     Medical: Not on file     Non-medical: Not on file   Tobacco Use     Smoking status: Never Smoker     Smokeless tobacco: Never Used   Substance and Sexual Activity     Alcohol use: No     Drug use: Yes     Types: Marijuana     Sexual activity: Yes     Partners: Female   Lifestyle     Physical activity     Days per week: Not on file     Minutes per session: Not on file     Stress: Not on file   Relationships     Social connections     Talks on phone: Not on file     Gets together: Not on file     Attends Yarsanism service: Not on file     Active member of club or organization: Not on file     Attends meetings of clubs or organizations: Not on file     Relationship status: Not on file     Intimate partner violence     Fear of current or ex partner: Not on file     Emotionally abused: Not on file     Physically abused: Not on file     Forced sexual activity: Not on file   Other Topics Concern     Not on file   Social History Narrative     Not on file           Review of System:     Constitutional: Negative for fever or chills  Skin: Negative for rashes  Ears/Nose/Throat: Negative for nasal congestion, sore throat  Respiratory: No shortness of breath, dyspnea on exertion, cough, or hemoptysis  Cardiovascular: Negative for chest pain  Gastrointestinal: Negative for nausea, vomiting  Genitourinary: Negative for dysuria, hematuria  Musculoskeletal: Negative for myalgias  Neurologic: Negative for headaches  Psychiatric: positive for depression, anxiety  Hematologic/Lymphatic/Immunologic: Negative  Endocrine: Negative  Behavioral: Negative for tobacco use       Physical Exam:   There were no vitals taken for this visit.    GENERAL: alert and no distress  EYES: eyes grossly normal to inspection, and conjunctivae and sclerae normal  HENT: Normocephalic atraumatic. Nose and mouth without ulcers or lesions  NECK: supple  RESP: no cough  CV: patient appears  hemodynamically stable.   LYMPH: no peripheral edema   ABDOMEN: nondistended  MS: no gross musculoskeletal defects noted  SKIN: no suspicious lesions or rashes  NEURO: Alert & Oriented x 3.   PSYCH: mentation appears normal, anxious affect with no acute suicidal ideations        Diagnostic Test Results:     PHQ-9 score:    PHQ-9 SCORE 10/5/2020   PHQ-9 Total Score 0         ANDRESSA-7 SCORE 1/2/2020 1/29/2020   Total Score 21 21           ASSESSMENT/PLAN:   (F33.9) Episode of recurrent major depressive disorder, unspecified depression episode severity (H)  (primary encounter diagnosis)  (F41.0) Panic attack  (F41.9) Anxiety  (primary encounter diagnosis)  Comment: poorly controlled anxiety and depression symptoms on previous Valium medication, no acute suicidal ideations.  Plan: I have replaced the patient's Valium 5mg medication with ALPRAZolam (XANAX) 0.25 MG tablet in order to improve anxiety symtpos control and to prevent panic attacks leading to ER visits. In the meantime, I have also advised the patient to continue his MENTAL HEALTH Psychiatry clinic follow up with the [facility]: Psychiatry Clinic [phone] going forward.      (J45.20) Intermittent asthma without complication, unspecified asthma severity  Comment: stable. No signs of acute asthma exacerbation symptoms at this time.  Plan: continue current therapy    Follow Up Plan:     Patient is instructed to return to Internal Medicine clinic for follow-up visit in 2 to 3 months.        Elizabeth Grace MD  Internal Medicine  [facility]      Video-Visit Details    Type of service:  Video Visit    Video Start Time: 2:30 PM  Video End Time: 3:00 PM    Originating Location (pt. Location): Home    Distant Location (provider location):  [facility]     Platform used for Video Visit: [employer] [employer]

## 2020-10-06 PROBLEM — F41.0 PANIC ATTACK: Status: ACTIVE | Noted: 2020-10-06

## 2020-10-06 PROBLEM — F33.2 SEVERE EPISODE OF RECURRENT MAJOR DEPRESSIVE DISORDER, WITHOUT PSYCHOTIC FEATURES (H): Status: ACTIVE | Noted: 2020-10-06

## 2021-02-10 DIAGNOSIS — F41.9 ANXIETY: ICD-10-CM

## 2021-02-10 DIAGNOSIS — F41.0 PANIC ATTACK: ICD-10-CM

## 2021-02-10 NOTE — TELEPHONE ENCOUNTER
Pt called     Heading out of town for work     Requesting a refill before leaving     Last visit 10/5/2020 Patient is instructed to return to Internal Medicine clinic for follow-up visit in 2 to 3 months.    Pt is scheduled for video visit on Friday - requesting refill today     ALPRAZolam (XANAX) 0.25 MG tablet 60 tablet 1 10/5/2020  --   Sig - Route: Take 1 tablet (0.25 mg) by mouth 3 times daily as needed for anxiety - Oral   Sent to pharmacy as: ALPRAZolam 0.25 MG Oral Tablet (XANAX)   Class: E-Prescribe   Order: [id number]   E-Prescribing Status: Receipt confirmed by pharmacy (10/5/2020 12:26 PM CDT)   Printout Tracking

## 2021-02-12 ENCOUNTER — VIRTUAL VISIT (OUTPATIENT)
Dept: FAMILY MEDICINE | Facility: CLINIC | Age: 26
End: 2021-02-12
Payer: COMMERCIAL

## 2021-02-12 DIAGNOSIS — F41.9 ANXIETY: Primary | ICD-10-CM

## 2021-02-12 DIAGNOSIS — J45.20 INTERMITTENT ASTHMA, UNSPECIFIED ASTHMA SEVERITY, UNSPECIFIED WHETHER COMPLICATED: ICD-10-CM

## 2021-02-12 DIAGNOSIS — F41.0 PANIC ATTACK: ICD-10-CM

## 2021-02-12 DIAGNOSIS — F33.2 SEVERE EPISODE OF RECURRENT MAJOR DEPRESSIVE DISORDER, WITHOUT PSYCHOTIC FEATURES (H): ICD-10-CM

## 2021-02-12 PROCEDURE — 99214 OFFICE O/P EST MOD 30 MIN: CPT | Mod: 95 | Performed by: INTERNAL MEDICINE

## 2021-02-12 RX ORDER — ALPRAZOLAM 0.25 MG
0.25 TABLET ORAL 2 TIMES DAILY PRN
Qty: 60 TABLET | Refills: 1 | Status: SHIPPED | OUTPATIENT
Start: 2021-02-12 | End: 2023-06-12

## 2021-02-12 ASSESSMENT — PATIENT HEALTH QUESTIONNAIRE - PHQ9: SUM OF ALL RESPONSES TO PHQ QUESTIONS 1-9: 0

## 2021-02-12 NOTE — PROGRESS NOTES
Hernandez Araujo is a 25 year old male who is being evaluated via a billable telephone visit.      How would you like to obtain your AVS? [employer]  cell phone: [phone]  Will anyone else be joining your video visit? No  Patient has given verbal consent for phone visit? Yes    Subjective     Hernandez Araujo is a 25 year old male who presents today via telephone visit for the following health issues:    HPI       Chief Complaint:       Depression and Anxiety Follow-Up      Social History     Tobacco Use     Smoking status: Never Smoker     Smokeless tobacco: Never Used   Substance Use Topics     Alcohol use: No     Drug use: Yes     Types: Marijuana     PHQ 1/29/2020 10/5/2020 2/12/2021   PHQ-9 Total Score 14 0 0   Q9: Thoughts of better off dead/self-harm past 2 weeks Not at all Not at all Not at all   PHQ-A Suicide Ideation past 2 weeks Nearly every day - -     ANDRESSA-7 SCORE 1/2/2020 1/29/2020   Total Score 21 21     Last PHQ-9 2/12/2021   1.  Little interest or pleasure in doing things 0   2.  Feeling down, depressed, or hopeless 0   3.  Trouble falling or staying asleep, or sleeping too much 0   4.  Feeling tired or having little energy 0   5.  Poor appetite or overeating 0   6.  Feeling bad about yourself 0   7.  Trouble concentrating 0   8.  Moving slowly or restless 0   Q9: Thoughts of better off dead/self-harm past 2 weeks 0   PHQ-9 Total Score 0   Difficulty at work, home, or with people -     ANDRESSA-7  1/29/2020   1. Feeling nervous, anxious, or on edge 3   2. Not being able to stop or control worrying 3   3. Worrying too much about different things 3   4. Trouble relaxing 3   5. Being so restless that it is hard to sit still 3   6. Becoming easily annoyed or irritable 3   7. Feeling afraid, as if something awful might happen 3   ANRDESSA-7 Total Score 21   If you checked any problems, how difficult have they made it for you to do your work, take care of things at home, or get along with other  people? Extremely difficult         Current Medications:     Current Outpatient Medications   Medication Sig Dispense Refill     ALPRAZolam (XANAX) 0.25 MG tablet Take 1 tablet (0.25 mg) by mouth 2 times daily as needed for anxiety 60 tablet 1         Allergies:      Allergies   Allergen Reactions     Peanuts [Nuts] Shortness Of Breath            Past Medical History:     Past Medical History:   Diagnosis Date     Anxiety      Asthma      Depressive disorder          Past Surgical History:   No past surgical history on file.      Family Medical History:     Family History   Problem Relation Age of Onset     Anxiety Disorder Mother          Social History:     Social History     Socioeconomic History     Marital status: Single     Spouse name: Not on file     Number of children: Not on file     Years of education: Not on file     Highest education level: Not on file   Occupational History     Not on file   Social Needs     Financial resource strain: Not on file     Food insecurity     Worry: Not on file     Inability: Not on file     Transportation needs     Medical: Not on file     Non-medical: Not on file   Tobacco Use     Smoking status: Never Smoker     Smokeless tobacco: Never Used   Substance and Sexual Activity     Alcohol use: No     Drug use: Yes     Types: Marijuana     Sexual activity: Yes     Partners: Female   Lifestyle     Physical activity     Days per week: Not on file     Minutes per session: Not on file     Stress: Not on file   Relationships     Social connections     Talks on phone: Not on file     Gets together: Not on file     Attends Protestant service: Not on file     Active member of club or organization: Not on file     Attends meetings of clubs or organizations: Not on file     Relationship status: Not on file     Intimate partner violence     Fear of current or ex partner: Not on file     Emotionally abused: Not on file     Physically abused: Not on file     Forced sexual activity: Not on  file   Other Topics Concern     Not on file   Social History Narrative     Not on file           Review of System:     Constitutional: Negative for fever or chills  Skin: Negative for rashes  Ears/Nose/Throat: Negative for nasal congestion, sore throat  Respiratory: No shortness of breath, dyspnea on exertion, cough, or hemoptysis  Cardiovascular: Negative for chest pain  Gastrointestinal: Negative for nausea, vomiting  Genitourinary: Negative for dysuria, hematuria  Musculoskeletal: Negative for myalgias  Neurologic: Negative for headaches  Psychiatric: positive for depression, anxiety  Hematologic/Lymphatic/Immunologic: Negative  Endocrine: Negative  Behavioral: Negative for tobacco use       Physical Exam:   There were no vitals taken for this visit.    RESP: no cough over the phone  NEURO: Alert & Oriented x 3.   PSYCH: mentation appears normal, anxious affect with no acute suicidal ideations        Diagnostic Test Results:     PHQ-9 score:    PHQ-9 SCORE 2/12/2021   PHQ-9 Total Score 0       ANDRESSA-7 SCORE 1/2/2020 1/29/2020   Total Score 21 21           ASSESSMENT/PLAN:   (F33.9) Episode of recurrent major depressive disorder, unspecified depression episode severity (H)    (F41.0) Panic attack  (F41.9) Anxiety  (primary encounter diagnosis)  Comment: poorly controlled anxiety and depression symptoms on previous Valium medication, no acute suicidal ideations.  Plan: I have replaced the patient's Valium 5mg medication with ALPRAZolam (XANAX) 0.25 MG tablet in order to improve anxiety symtpos control and to prevent panic attacks leading to ER visits. In the meantime, I have also advised the patient to continue his MENTAL HEALTH Psychiatry clinic follow up with the [facility]: Psychiatry Clinic [phone] going forward.      (J45.20) Intermittent asthma without complication, unspecified asthma severity  Comment: stable. No signs of acute asthma exacerbation symptoms at this time.  Plan: continue current  therapy    Follow Up Plan:     Patient is instructed to return to Internal Medicine clinic for follow-up visit in 3 months.    Phone call duration: 30 minutes    Elizabeth Grace MD  Internal Medicine  [facility]

## 2021-02-13 ASSESSMENT — ASTHMA QUESTIONNAIRES: ACT_TOTALSCORE: 25

## 2021-02-14 RX ORDER — ALPRAZOLAM 0.25 MG
0.25 TABLET ORAL 3 TIMES DAILY PRN
Qty: 60 TABLET | Refills: 1
Start: 2021-02-14 | End: 2023-06-12

## 2021-05-10 ENCOUNTER — PATIENT OUTREACH (OUTPATIENT)
Dept: CARE COORDINATION | Facility: CLINIC | Age: 26
End: 2021-05-10

## 2021-05-10 DIAGNOSIS — Z71.89 OTHER SPECIFIED COUNSELING: Primary | Chronic | ICD-10-CM

## 2021-05-10 NOTE — PROGRESS NOTES
Clinic Care Coordination Contact  [facility]/Voicemail       Clinical Data: Care Coordinator Outreach  Outreach attempted x 1.  Unable to leave a message on patient's voicemail with call back information and requested return call due to mailbox not set up yet   Plan: Care Coordinator will try to reach patient again in 1-2 business days.

## 2021-05-11 NOTE — PROGRESS NOTES
Clinic Care Coordination Contact  Community Health Worker Initial Outreach    CHW Initial Information Gathering:  Referral Source: ED Follow-Up  No PCP office visit in Past Year: No  CHW Additional Questions  MyChart active?: No    Patient accepts CC: No, Patient stated he was fine and ended the call . Patient will be sent Care Coordination introduction letter for future reference.

## 2022-01-05 ENCOUNTER — TELEPHONE (OUTPATIENT)
Dept: FAMILY MEDICINE | Facility: CLINIC | Age: 27
End: 2022-01-05
Payer: COMMERCIAL

## 2022-01-05 DIAGNOSIS — F41.9 ANXIETY: ICD-10-CM

## 2022-01-05 DIAGNOSIS — F41.0 PANIC ATTACK: ICD-10-CM

## 2022-01-05 NOTE — TELEPHONE ENCOUNTER
Patient was checking to make sure we received this refill request. Bianca Doss   [facility]  Central Scheduler

## 2022-01-06 NOTE — TELEPHONE ENCOUNTER
Patient called, requesting refill of medication. Patient is out of medication.    Patient requesting pharmacy to be sent to [facility] [address] [place] [address]- pended      Routing refill request to provider for review/approval because:  Drug not on the [facility] refill protocol     Pending Prescriptions:                       Disp   Refills    ALPRAZolam (XANAX) 0.25 MG tablet [Pharma*60 tab*             Sig: TAKE 1 TABLET(0.25 MG) BY MOUTH TWICE DAILY AS           NEEDED FOR ANXIETY    Last Written Prescription Date:  2/12/2021  Last Fill Quantity: 60,  # refills: 1   Last office visit: 2/122021 with prescribing provider:  Elizabeth Grace  Future Office Visit:      Patient callback: [phone]- ok to leave detailed VM  Patient will not be available by phone after 11am, so if need to callback after 11am, requesting to call kathryn Soriano at [phone].      Panda Johnston RN  [facility] Triage Nurse

## 2022-01-11 RX ORDER — ALPRAZOLAM 0.25 MG
TABLET ORAL
Qty: 60 TABLET | OUTPATIENT
Start: 2022-01-11

## 2022-01-12 NOTE — TELEPHONE ENCOUNTER
Routing to TCs to contact patient to inform due for appointment. Thank you.     Pt needs appointment with PCP prior to him refilling Rx  Kandice ALCAZAR, Triage RN  [facility] Internal Medicine Clinic

## 2022-02-16 ENCOUNTER — TRANSFERRED RECORDS (OUTPATIENT)
Dept: HEALTH INFORMATION MANAGEMENT | Facility: CLINIC | Age: 27
End: 2022-02-16
Payer: COMMERCIAL

## 2022-02-16 LAB
ALT SERPL-CCNC: 13 U/L (ref 9–46)
AST SERPL-CCNC: 18 U/L (ref 10–40)
CREATININE (EXTERNAL): 0.84 MG/DL (ref 0.6–1.35)
GFR ESTIMATED (EXTERNAL): 120 ML/MIN/1.73M2
GFR ESTIMATED (IF AFRICAN AMERICAN) (EXTERNAL): 139 ML/MIN/1.73M2
GLUCOSE (EXTERNAL): 98 MG/DL (ref 65–99)
POTASSIUM (EXTERNAL): 4.4 MMOL/L (ref 3.5–5.3)
TSH SERPL-ACNC: 0.68 MIU/L (ref 0.4–4.5)

## 2022-02-24 ENCOUNTER — TRANSCRIBE ORDERS (OUTPATIENT)
Dept: OTHER | Age: 27
End: 2022-02-24
Payer: COMMERCIAL

## 2022-02-24 ENCOUNTER — MEDICAL CORRESPONDENCE (OUTPATIENT)
Dept: HEALTH INFORMATION MANAGEMENT | Facility: CLINIC | Age: 27
End: 2022-02-24
Payer: COMMERCIAL

## 2022-02-24 DIAGNOSIS — A69.20 ACUTE LYME DISEASE: Primary | ICD-10-CM

## 2022-03-07 NOTE — TELEPHONE ENCOUNTER
RECORDS RECEIVED FROM: External   DATE RECEIVED: 03.30.2022   NOTES (Gather within 2 years) STATUS DETAILS   OFFICE NOTE from referring provider   In process    LABS (any labs) Internal    MEDICATION LIST N/A    IMAGING  (NEED IMAGES AND REPORTS)     Osteomyelitis: Foot imaging  N/A    Liver Abscess: Abdominal imaging N/A    Other (anything related to diagnoses N/A       Action 03.30.2022 RM   Action Taken Pending for records     Action 03.16.2022 RM   Action Taken Called [facility] for records called [phone] lvm for records     Action 03.18.2022 RM   Action Taken Received call back from [facility] stating pt have not been seen since 2019 and they did not refer him to us. Called the pt at [phone], spoke with the pt who states he has been seen they are treating him as well. Call them back to clarify states they have no records     Action 3.25.22 sv    Action Taken Called [facility] for records, per him they dont have records for this patient. In basket sent to ID clinic on next course of action

## 2022-03-18 ENCOUNTER — MEDICAL CORRESPONDENCE (OUTPATIENT)
Dept: HEALTH INFORMATION MANAGEMENT | Facility: CLINIC | Age: 27
End: 2022-03-18
Payer: COMMERCIAL

## 2022-03-29 ENCOUNTER — TRANSCRIBE ORDERS (OUTPATIENT)
Dept: MULTI SPECIALTY CLINIC | Facility: CLINIC | Age: 27
End: 2022-03-29
Payer: COMMERCIAL

## 2022-03-29 DIAGNOSIS — M25.50 ARTHRALGIA, UNSPECIFIED JOINT: Primary | ICD-10-CM

## 2022-03-30 ENCOUNTER — OFFICE VISIT (OUTPATIENT)
Dept: INFECTIOUS DISEASES | Facility: CLINIC | Age: 27
End: 2022-03-30
Attending: NURSE PRACTITIONER
Payer: COMMERCIAL

## 2022-03-30 ENCOUNTER — PRE VISIT (OUTPATIENT)
Dept: INFECTIOUS DISEASES | Facility: CLINIC | Age: 27
End: 2022-03-30
Payer: COMMERCIAL

## 2022-03-30 ENCOUNTER — LAB (OUTPATIENT)
Dept: LAB | Facility: CLINIC | Age: 27
End: 2022-03-30
Attending: STUDENT IN AN ORGANIZED HEALTH CARE EDUCATION/TRAINING PROGRAM
Payer: COMMERCIAL

## 2022-03-30 VITALS
WEIGHT: 162.6 LBS | SYSTOLIC BLOOD PRESSURE: 107 MMHG | OXYGEN SATURATION: 97 % | BODY MASS INDEX: 20.88 KG/M2 | HEART RATE: 83 BPM | DIASTOLIC BLOOD PRESSURE: 67 MMHG

## 2022-03-30 DIAGNOSIS — F41.9 ANXIETY: ICD-10-CM

## 2022-03-30 DIAGNOSIS — A69.20 ACUTE LYME DISEASE: ICD-10-CM

## 2022-03-30 DIAGNOSIS — F41.9 ANXIETY: Primary | ICD-10-CM

## 2022-03-30 LAB
ALBUMIN SERPL-MCNC: 4.2 G/DL (ref 3.4–5)
ALP SERPL-CCNC: 79 U/L (ref 40–150)
ALT SERPL W P-5'-P-CCNC: 19 U/L (ref 0–70)
ANION GAP SERPL CALCULATED.3IONS-SCNC: 7 MMOL/L (ref 3–14)
AST SERPL W P-5'-P-CCNC: 15 U/L (ref 0–45)
BASOPHILS # BLD AUTO: 0 10E3/UL (ref 0–0.2)
BASOPHILS NFR BLD AUTO: 1 %
BILIRUB SERPL-MCNC: 0.7 MG/DL (ref 0.2–1.3)
BUN SERPL-MCNC: 11 MG/DL (ref 7–30)
CALCIUM SERPL-MCNC: 8.8 MG/DL (ref 8.5–10.1)
CHLORIDE BLD-SCNC: 109 MMOL/L (ref 94–109)
CO2 SERPL-SCNC: 27 MMOL/L (ref 20–32)
CREAT SERPL-MCNC: 0.84 MG/DL (ref 0.66–1.25)
CRP SERPL-MCNC: <2.9 MG/L (ref 0–8)
EOSINOPHIL # BLD AUTO: 0.2 10E3/UL (ref 0–0.7)
EOSINOPHIL NFR BLD AUTO: 4 %
ERYTHROCYTE [DISTWIDTH] IN BLOOD BY AUTOMATED COUNT: 13.4 % (ref 10–15)
ERYTHROCYTE [SEDIMENTATION RATE] IN BLOOD BY WESTERGREN METHOD: 2 MM/HR (ref 0–15)
GFR SERPL CREATININE-BSD FRML MDRD: >90 ML/MIN/1.73M2
GLUCOSE BLD-MCNC: 91 MG/DL (ref 70–99)
HCT VFR BLD AUTO: 41.5 % (ref 40–53)
HGB BLD-MCNC: 13.5 G/DL (ref 13.3–17.7)
IMM GRANULOCYTES # BLD: 0 10E3/UL
IMM GRANULOCYTES NFR BLD: 0 %
LYMPHOCYTES # BLD AUTO: 2.4 10E3/UL (ref 0.8–5.3)
LYMPHOCYTES NFR BLD AUTO: 44 %
MCH RBC QN AUTO: 27.8 PG (ref 26.5–33)
MCHC RBC AUTO-ENTMCNC: 32.5 G/DL (ref 31.5–36.5)
MCV RBC AUTO: 85 FL (ref 78–100)
MONOCYTES # BLD AUTO: 0.4 10E3/UL (ref 0–1.3)
MONOCYTES NFR BLD AUTO: 7 %
NEUTROPHILS # BLD AUTO: 2.4 10E3/UL (ref 1.6–8.3)
NEUTROPHILS NFR BLD AUTO: 44 %
NRBC # BLD AUTO: 0 10E3/UL
NRBC BLD AUTO-RTO: 0 /100
PLATELET # BLD AUTO: 218 10E3/UL (ref 150–450)
POTASSIUM BLD-SCNC: 4 MMOL/L (ref 3.4–5.3)
PROT SERPL-MCNC: 6.9 G/DL (ref 6.8–8.8)
RBC # BLD AUTO: 4.86 10E6/UL (ref 4.4–5.9)
SODIUM SERPL-SCNC: 143 MMOL/L (ref 133–144)
WBC # BLD AUTO: 5.4 10E3/UL (ref 4–11)

## 2022-03-30 PROCEDURE — 99204 OFFICE O/P NEW MOD 45 MIN: CPT | Performed by: STUDENT IN AN ORGANIZED HEALTH CARE EDUCATION/TRAINING PROGRAM

## 2022-03-30 PROCEDURE — 86140 C-REACTIVE PROTEIN: CPT | Performed by: PATHOLOGY

## 2022-03-30 PROCEDURE — 36415 COLL VENOUS BLD VENIPUNCTURE: CPT | Performed by: PATHOLOGY

## 2022-03-30 PROCEDURE — 85025 COMPLETE CBC W/AUTO DIFF WBC: CPT | Performed by: PATHOLOGY

## 2022-03-30 PROCEDURE — 85652 RBC SED RATE AUTOMATED: CPT | Performed by: PATHOLOGY

## 2022-03-30 PROCEDURE — 80053 COMPREHEN METABOLIC PANEL: CPT | Performed by: PATHOLOGY

## 2022-03-30 NOTE — LETTER
3/30/2022       RE: Hernandez Araujo  [address] [facility] [address]     Dear Colleague,    Thank you for referring your patient, Hernandez Araujo, to the [facility] INFECTIOUS DISE[facility]. Please see a copy of my visit note below.     [facility] Infectious Disease [facility]  Dr. Randy Gusman, [facility], Floor [address]  [address] [place] [address]   Patient:  Hernandez Araujo, Date of birth 1995, Medical record number [id number]  Date of Visit:  03/30/2022         Assessment and Recommendations:   25yo M with history of anxiety, depression, and diffuse, migratory joint pains x 15 years presents for evaluation and discussion of Lyme disease results vs other possible infectious etiologies.     Reviewing symptoms and results, very low suspicion this his condition is infectious in nature. Lyme western blot was only positive for two IgM bands which, per commonly used guidelines, does not meet criteria for Lyme diagnosis. Compounding this, it would be unlikely that his symptoms, which he clearly describes being triggered by prior injuries to his knees and elbows, and then worsened only during times of anxiety attacks, would have an infectious (particularly Lyme) source.     Rather, I suspect the bulk of his symptoms are related to uncontrolled anxiety and depression. I thought perhaps there was an inflammatory process at play, as well, as that can sometimes be a sequela of distant infection, but CRP and WBC performed today are normal.     1) Do not recommend further antibiotics, and do not suspect Lyme disease as diagnosis here.  2) Recommend focusing on management of anxiety and depression, which patient is currently managing with therapy.     Randy Gusman MD  Division of Infectious Diseases and International Medicine  [phone]         History of Infectious  "Disease Illness:   Hernandez Araujo is a 25yo M with history of anxiety, depression and 15 years of intermittent, migratory joint pains who presents for evaluation of the same and interpretation of Lyme western blot ordered by nurse practitioner at his primary care clinic. Per patient, pain began in his knees 15 years ago after football related injuries. Within the same year, he injured his elbows playing basketball. Since those injuries, these pains have intermittently flared up, not all at the same time in most instances. He also has had significant difficulties with anxiety and depression, involving need for inpatient care, describing panic attacks 2 years ago that led to muscle contortion and cramping (denies selective serotonin reuptake inhibitor or other seratonergic medication use). He uses marijuana daily, denies other drug use, and does not drink. [place]\" and endorses prior tick bites while growing up, though does not recall target lesion ever occurring. 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 Presently, denies recent fevers, chills, N/V, headaches. He does occasionally have hand/finger paresthesia, but says it is only during episodes of elevated anxiety and resolves once these episodes end.         Past Medical and Surgical History:     Past Medical History:   Diagnosis Date     Anxiety      Asthma      Depressive disorder        No past surgical history on file.        Family History:     Family History   Problem Relation Age of Onset     Anxiety Disorder Mother            Social History:     Social History     Tobacco Use     Smoking status: Never Smoker     Smokeless tobacco: Never Used   Substance Use Topics     Alcohol use: No     Drug use: Yes     Types: Marijuana     Social History     Social History Narrative     Not on file            Review of Systems:   CONSTITUTIONAL:  No fevers or chills  EYES: negative for " icterus  ENT:  negative for hearing loss, tinnitus, sore throat  RESPIRATORY:  negative for cough, sputum or dyspnea  CARDIOVASCULAR:  negative for chest pain, palpitations  GASTROINTESTINAL:  negative for nausea, vomiting, diarrhea or constipation  GENITOURINARY:  negative for dysuria  HEME:  No easy bruising  INTEGUMENT:  negative for rash or pruritus  NEURO:  Negative for headache         Current Medications:     Current Outpatient Medications   Medication Sig Dispense Refill     ALPRAZolam (XANAX) 0.25 MG tablet Take 1 tablet (0.25 mg) by mouth 3 times daily as needed for anxiety 60 tablet 1     ALPRAZolam (XANAX) 0.25 MG tablet Take 1 tablet (0.25 mg) by mouth 2 times daily as needed for anxiety (Patient not taking: Reported on 3/30/2022) 60 tablet 1            Immunization History:     Immunization History   Administered Date(s) Administered     DTAP (<7y) 1994, 03/15/1995, 04/13/1999, 09/15/1999     FLU 6-35 months 10/31/1997, 12/09/2003, 10/31/2006, 10/24/2008, 11/04/2009     Flu, Unspecified 10/31/1997, 12/09/2003, 10/31/2006, 10/24/2008, 11/04/2009, 12/09/2010     D1f5-38 Novel Flu 12/09/2009     Y9m4-86 Novel Flu- Nasal 12/09/2009     HPV Quadrivalent 12/09/2010, 04/08/2011     Hep B, Peds or Adolescent 1994, 03/15/1995, 04/13/1999, 09/15/1999, 11/03/2005     HepA-ped 2 Dose 02/15/2008, 08/20/2008     HepB, Unspecified 04/13/1999, 09/15/1999, 11/03/2005     Hib (PRP-T) 1994, 03/15/1995, 09/15/1999     Historic Hib Hib-titer 1994, 03/15/1995, 09/15/1999     Historical DTP/aP 1994, 03/15/1995     Influenza (H1N1) 12/09/2009     Influenza (IIV3) PF 10/24/2008, 12/09/2010, 01/08/2013     MMR 04/13/1999, 09/15/1999     Meningococcal (Menactra ) 03/19/2010     OPV, trivalent, live 04/13/1999, 09/15/1999     Poliovirus, inactivated (IPV) 1994, 03/15/1995, 04/13/1995, 04/13/1999, 09/15/1999     TD (ADULT, 7+) 11/03/2005     TDAP Vaccine (Adacel) 11/03/2005, 12/09/2010      Varicella 12/21/2007            Allergies:     Allergies   Allergen Reactions     Peanuts [Nuts] Shortness Of Breath            Physical Exam:   Vital signs:  /67   Pulse 83   Wt 73.8 kg (162 lb 9.6 oz)   SpO2 97%   BMI 20.88 kg/m      Physical Examination:  GENERAL:  well-developed, well-nourished, seated in no acute distress.  HEENT:  Head is normocephalic, atraumatic   EYES:  Eyes have anicteric sclerae without conjunctival injection   ENT:  Oropharynx is moist without exudates or ulcers. Tongue is midline  NECK:  Supple. No cervical lymphadenopathy  LUNGS:  Clear to auscultation bilateral.   CARDIOVASCULAR:  Regular rate and rhythm with no murmurs, gallops or rubs.  ABDOMEN:  Normal bowel sounds, soft, nontender. No appreciable hepatosplenomegaly.  SKIN:  No acute rashes.    NEUROLOGIC:  Grossly nonfocal. Active x4 extremities         Laboratory Data:     Metabolic Studies   Sodium   Date Value Ref Range Status   03/30/2022 143 133 - 144 mmol/L Final     Potassium   Date Value Ref Range Status   03/30/2022 4.0 3.4 - 5.3 mmol/L Final     Chloride   Date Value Ref Range Status   03/30/2022 109 94 - 109 mmol/L Final     Carbon Dioxide (CO2)   Date Value Ref Range Status   03/30/2022 27 20 - 32 mmol/L Final     Anion Gap   Date Value Ref Range Status   03/30/2022 7 3 - 14 mmol/L Final     Urea Nitrogen   Date Value Ref Range Status   03/30/2022 11 7 - 30 mg/dL Final     Creatinine   Date Value Ref Range Status   03/30/2022 0.84 0.66 - 1.25 mg/dL Final     GFR Estimate   Date Value Ref Range Status   03/30/2022 >90 >60 mL/min/1.73m2 Final     Comment:     Effective December 21, 2021 eGFRcr in adults is calculated using the 2021 CKD-EPI creatinine equation which includes age and gender (Melecio cordero al., NEJM, DOI: 10.1056/CISQvs2531033)     Glucose   Date Value Ref Range Status   03/30/2022 91 70 - 99 mg/dL Final     Calcium   Date Value Ref Range Status   03/30/2022 8.8 8.5 - 10.1 mg/dL Final     CRP  Inflammation   Date Value Ref Range Status   03/30/2022 <2.9 0.0 - 8.0 mg/L Final     Inflammatory Markers   CRP Inflammation   Date Value Ref Range Status   03/30/2022 <2.9 0.0 - 8.0 mg/L Final     Hepatic Studies    Bilirubin Total   Date Value Ref Range Status   03/30/2022 0.7 0.2 - 1.3 mg/dL Final     Alkaline Phosphatase   Date Value Ref Range Status   03/30/2022 79 40 - 150 U/L Final     Albumin   Date Value Ref Range Status   03/30/2022 4.2 3.4 - 5.0 g/dL Final     AST   Date Value Ref Range Status   03/30/2022 15 0 - 45 U/L Final     ALT   Date Value Ref Range Status   03/30/2022 19 0 - 70 U/L Final     Hematology Studies      WBC Count   Date Value Ref Range Status   03/30/2022 5.4 4.0 - 11.0 10e3/uL Final     Hemoglobin   Date Value Ref Range Status   03/30/2022 13.5 13.3 - 17.7 g/dL Final     Hematocrit   Date Value Ref Range Status   03/30/2022 41.5 40.0 - 53.0 % Final     Platelet Count   Date Value Ref Range Status   03/30/2022 218 150 - 450 10e3/uL Final       Randy Gusman MD

## 2022-03-30 NOTE — PROGRESS NOTES
[facility]  Dr. Randy Gusman, [facility], Floor 3  [address] [place] [address]   Patient:  Hernandez Araujo, Date of birth 1995, Medical record number [id number]  Date of Visit:  03/30/2022         Assessment and Recommendations:   27yo M with history of anxiety, depression, and diffuse, migratory joint pains x 15 years presents for evaluation and discussion of Lyme disease results vs other possible infectious etiologies.     Reviewing symptoms and results, very low suspicion this his condition is infectious in nature. Lyme western blot was only positive for two IgM bands which, per commonly used guidelines, does not meet criteria for Lyme diagnosis. Compounding this, it would be unlikely that his symptoms, which he clearly describes being triggered by prior injuries to his knees and elbows, and then worsened only during times of anxiety attacks, would have an infectious (particularly Lyme) source.     Rather, I suspect the bulk of his symptoms are related to uncontrolled anxiety and depression. I thought perhaps there was an inflammatory process at play, as well, as that can sometimes be a sequela of distant infection, but CRP and WBC performed today are normal.     1) Do not recommend further antibiotics, and do not suspect Lyme disease as diagnosis here.  2) Recommend focusing on management of anxiety and depression, which patient is currently managing with therapy.     Randy Gusman MD  Division of Infectious Diseases and International Medicine  [phone]         History of Infectious Disease Illness:   Hernandez Araujo is a 27yo M with history of anxiety, depression and 15 years of intermittent, migratory joint pains who presents for evaluation of the same and interpretation of Lyme western blot ordered by nurse practitioner at his primary care clinic. Per patient, pain began in his knees 15 years ago after football  "related injuries. Within the same year, he injured his elbows playing basketball. Since those injuries, these pains have intermittently flared up, not all at the same time in most instances. He also has had significant difficulties with anxiety and depression, involving need for inpatient care, describing panic attacks 2 years ago that led to muscle contortion and cramping (denies selective serotonin reuptake inhibitor or other seratonergic medication use). He uses marijuana daily, denies other drug use, and does not drink. [place]\" and endorses prior tick bites while growing up, though does not recall target lesion ever occurring. 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 Presently, denies recent fevers, chills, N/V, headaches. He does occasionally have hand/finger paresthesia, but says it is only during episodes of elevated anxiety and resolves once these episodes end.         Past Medical and Surgical History:     Past Medical History:   Diagnosis Date     Anxiety      Asthma      Depressive disorder        No past surgical history on file.        Family History:     Family History   Problem Relation Age of Onset     Anxiety Disorder Mother            Social History:     Social History     Tobacco Use     Smoking status: Never Smoker     Smokeless tobacco: Never Used   Substance Use Topics     Alcohol use: No     Drug use: Yes     Types: Marijuana     Social History     Social History Narrative     Not on file            Review of Systems:   CONSTITUTIONAL:  No fevers or chills  EYES: negative for icterus  ENT:  negative for hearing loss, tinnitus, sore throat  RESPIRATORY:  negative for cough, sputum or dyspnea  CARDIOVASCULAR:  negative for chest pain, palpitations  GASTROINTESTINAL:  negative for nausea, vomiting, diarrhea or constipation  GENITOURINARY:  negative for dysuria  HEME:  No easy bruising  INTEGUMENT:  negative for rash or " pruritus  NEURO:  Negative for headache         Current Medications:     Current Outpatient Medications   Medication Sig Dispense Refill     ALPRAZolam (XANAX) 0.25 MG tablet Take 1 tablet (0.25 mg) by mouth 3 times daily as needed for anxiety 60 tablet 1     ALPRAZolam (XANAX) 0.25 MG tablet Take 1 tablet (0.25 mg) by mouth 2 times daily as needed for anxiety (Patient not taking: Reported on 3/30/2022) 60 tablet 1            Immunization History:     Immunization History   Administered Date(s) Administered     DTAP (<7y) 1994, 03/15/1995, 04/13/1999, 09/15/1999     FLU 6-35 months 10/31/1997, 12/09/2003, 10/31/2006, 10/24/2008, 11/04/2009     Flu, Unspecified 10/31/1997, 12/09/2003, 10/31/2006, 10/24/2008, 11/04/2009, 12/09/2010     Q7t9-26 Novel Flu 12/09/2009     Z9f5-63 Novel Flu- Nasal 12/09/2009     HPV Quadrivalent 12/09/2010, 04/08/2011     Hep B, Peds or Adolescent 1994, 03/15/1995, 04/13/1999, 09/15/1999, 11/03/2005     HepA-ped 2 Dose 02/15/2008, 08/20/2008     HepB, Unspecified 04/13/1999, 09/15/1999, 11/03/2005     Hib (PRP-T) 1994, 03/15/1995, 09/15/1999     Historic Hib Hib-titer 1994, 03/15/1995, 09/15/1999     Historical DTP/aP 1994, 03/15/1995     Influenza (H1N1) 12/09/2009     Influenza (IIV3) PF 10/24/2008, 12/09/2010, 01/08/2013     MMR 04/13/1999, 09/15/1999     Meningococcal (Menactra ) 03/19/2010     OPV, trivalent, live 04/13/1999, 09/15/1999     Poliovirus, inactivated (IPV) 1994, 03/15/1995, 04/13/1995, 04/13/1999, 09/15/1999     TD (ADULT, 7+) 11/03/2005     TDAP Vaccine (Adacel) 11/03/2005, 12/09/2010     Varicella 12/21/2007            Allergies:     Allergies   Allergen Reactions     Peanuts [Nuts] Shortness Of Breath            Physical Exam:   Vital signs:  /67   Pulse 83   Wt 73.8 kg (162 lb 9.6 oz)   SpO2 97%   BMI 20.88 kg/m      Physical Examination:  GENERAL:  well-developed, well-nourished, seated in no acute distress.  HEENT:   Head is normocephalic, atraumatic   EYES:  Eyes have anicteric sclerae without conjunctival injection   ENT:  Oropharynx is moist without exudates or ulcers. Tongue is midline  NECK:  Supple. No cervical lymphadenopathy  LUNGS:  Clear to auscultation bilateral.   CARDIOVASCULAR:  Regular rate and rhythm with no murmurs, gallops or rubs.  ABDOMEN:  Normal bowel sounds, soft, nontender. No appreciable hepatosplenomegaly.  SKIN:  No acute rashes.    NEUROLOGIC:  Grossly nonfocal. Active x4 extremities         Laboratory Data:     Metabolic Studies   Sodium   Date Value Ref Range Status   03/30/2022 143 133 - 144 mmol/L Final     Potassium   Date Value Ref Range Status   03/30/2022 4.0 3.4 - 5.3 mmol/L Final     Chloride   Date Value Ref Range Status   03/30/2022 109 94 - 109 mmol/L Final     Carbon Dioxide (CO2)   Date Value Ref Range Status   03/30/2022 27 20 - 32 mmol/L Final     Anion Gap   Date Value Ref Range Status   03/30/2022 7 3 - 14 mmol/L Final     Urea Nitrogen   Date Value Ref Range Status   03/30/2022 11 7 - 30 mg/dL Final     Creatinine   Date Value Ref Range Status   03/30/2022 0.84 0.66 - 1.25 mg/dL Final     GFR Estimate   Date Value Ref Range Status   03/30/2022 >90 >60 mL/min/1.73m2 Final     Comment:     Effective December 21, 2021 eGFRcr in adults is calculated using the 2021 CKD-EPI creatinine equation which includes age and gender (Melecio et al., NE, DOI: 10.1056/IKLIas9090245)     Glucose   Date Value Ref Range Status   03/30/2022 91 70 - 99 mg/dL Final     Calcium   Date Value Ref Range Status   03/30/2022 8.8 8.5 - 10.1 mg/dL Final     CRP Inflammation   Date Value Ref Range Status   03/30/2022 <2.9 0.0 - 8.0 mg/L Final     Inflammatory Markers   CRP Inflammation   Date Value Ref Range Status   03/30/2022 <2.9 0.0 - 8.0 mg/L Final     Hepatic Studies    Bilirubin Total   Date Value Ref Range Status   03/30/2022 0.7 0.2 - 1.3 mg/dL Final     Alkaline Phosphatase   Date Value Ref Range Status    03/30/2022 79 40 - 150 U/L Final     Albumin   Date Value Ref Range Status   03/30/2022 4.2 3.4 - 5.0 g/dL Final     AST   Date Value Ref Range Status   03/30/2022 15 0 - 45 U/L Final     ALT   Date Value Ref Range Status   03/30/2022 19 0 - 70 U/L Final     Hematology Studies      WBC Count   Date Value Ref Range Status   03/30/2022 5.4 4.0 - 11.0 10e3/uL Final     Hemoglobin   Date Value Ref Range Status   03/30/2022 13.5 13.3 - 17.7 g/dL Final     Hematocrit   Date Value Ref Range Status   03/30/2022 41.5 40.0 - 53.0 % Final     Platelet Count   Date Value Ref Range Status   03/30/2022 218 150 - 450 10e3/uL Final
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
## 2022-05-05 ENCOUNTER — ANCILLARY PROCEDURE (OUTPATIENT)
Dept: GENERAL RADIOLOGY | Facility: CLINIC | Age: 27
End: 2022-05-05
Attending: PHYSICIAN ASSISTANT
Payer: COMMERCIAL

## 2022-05-05 ENCOUNTER — OFFICE VISIT (OUTPATIENT)
Dept: RHEUMATOLOGY | Facility: CLINIC | Age: 27
End: 2022-05-05
Payer: COMMERCIAL

## 2022-05-05 VITALS
SYSTOLIC BLOOD PRESSURE: 112 MMHG | WEIGHT: 153 LBS | DIASTOLIC BLOOD PRESSURE: 60 MMHG | HEIGHT: 74 IN | BODY MASS INDEX: 19.64 KG/M2

## 2022-05-05 DIAGNOSIS — R70.0 ELEVATED SED RATE: ICD-10-CM

## 2022-05-05 DIAGNOSIS — G89.29 CHRONIC BILATERAL LOW BACK PAIN WITHOUT SCIATICA: ICD-10-CM

## 2022-05-05 DIAGNOSIS — M54.50 CHRONIC BILATERAL LOW BACK PAIN WITHOUT SCIATICA: ICD-10-CM

## 2022-05-05 DIAGNOSIS — M25.50 POLYARTHRALGIA: Primary | ICD-10-CM

## 2022-05-05 LAB — ERYTHROCYTE [SEDIMENTATION RATE] IN BLOOD BY WESTERGREN METHOD: 3 MM/HR (ref 0–15)

## 2022-05-05 PROCEDURE — 36415 COLL VENOUS BLD VENIPUNCTURE: CPT | Performed by: PHYSICIAN ASSISTANT

## 2022-05-05 PROCEDURE — 72170 X-RAY EXAM OF PELVIS: CPT | Mod: TC | Performed by: RADIOLOGY

## 2022-05-05 PROCEDURE — 81374 HLA I TYPING 1 ANTIGEN LR: CPT | Performed by: PHYSICIAN ASSISTANT

## 2022-05-05 PROCEDURE — 85652 RBC SED RATE AUTOMATED: CPT | Performed by: PHYSICIAN ASSISTANT

## 2022-05-05 PROCEDURE — 82306 VITAMIN D 25 HYDROXY: CPT | Performed by: PHYSICIAN ASSISTANT

## 2022-05-05 PROCEDURE — 99204 OFFICE O/P NEW MOD 45 MIN: CPT | Performed by: PHYSICIAN ASSISTANT

## 2022-05-05 NOTE — PATIENT INSTRUCTIONS
After Visit Instructions:     Thank you for coming to [facility] for your care. It is my goal to partner with you to help you reach your optimal state of health.         Plan:     Schedule follow-up with Kami Noble PA-C as needed.   Imaging: xray lumbar spine and pelvis  Labs: HLA-B27 typing, ESR and Vitamin D  Medication recommendations:   Okay to try over the counter NSAID of choice (Ibuprofen or Aleve). Follow package instructions        Kami Noble PA-C  [facility]  [facility]    Contact information: [facility]  Clinic Number:  [phone]  Please call or send a [facility] message with any questions about your care

## 2022-05-06 LAB — DEPRECATED CALCIDIOL+CALCIFEROL SERPL-MC: 36 UG/L (ref 20–75)

## 2022-05-12 LAB
B LOCUS: NORMAL
B27TEST METHOD: NORMAL

## 2022-05-13 ENCOUNTER — TELEPHONE (OUTPATIENT)
Dept: RHEUMATOLOGY | Facility: CLINIC | Age: 27
End: 2022-05-13
Payer: COMMERCIAL

## 2022-05-13 DIAGNOSIS — M47.899 HLA-B27 SPONDYLOARTHROPATHY: Primary | ICD-10-CM

## 2022-05-13 RX ORDER — MELOXICAM 15 MG/1
15 TABLET ORAL DAILY
Qty: 30 TABLET | Refills: 1 | Status: SHIPPED | OUTPATIENT
Start: 2022-05-13 | End: 2023-07-12

## 2022-05-13 NOTE — TELEPHONE ENCOUNTER
I called and spoke with the patient regarding his positive HLA-B27.  He certainly have symptoms suggestive of an inflammatory back pain.  X-rays were normal.  Discussed that therefore we would diagnose him with [id number].  He did try an anti-inflammatory 1 time and reports that it was helpful.  We discussed trying a prescription strength anti-inflammatory, Mobic.  He would like to try that.  Recommended that he take this medication with food and stop it if it causes any stomach upset.  We will have the patient follow-up with me in 3 months.    Kami Noble PA-C on 5/13/2022 at 12:43 PM

## 2022-05-14 ENCOUNTER — HEALTH MAINTENANCE LETTER (OUTPATIENT)
Age: 27
End: 2022-05-14

## 2022-09-03 ENCOUNTER — HEALTH MAINTENANCE LETTER (OUTPATIENT)
Age: 27
End: 2022-09-03

## 2023-06-03 ENCOUNTER — HEALTH MAINTENANCE LETTER (OUTPATIENT)
Age: 28
End: 2023-06-03

## 2023-06-12 ENCOUNTER — OFFICE VISIT (OUTPATIENT)
Dept: FAMILY MEDICINE | Facility: CLINIC | Age: 28
End: 2023-06-12
Payer: COMMERCIAL

## 2023-06-12 VITALS
HEART RATE: 85 BPM | DIASTOLIC BLOOD PRESSURE: 62 MMHG | RESPIRATION RATE: 15 BRPM | TEMPERATURE: 98.5 F | HEIGHT: 74 IN | SYSTOLIC BLOOD PRESSURE: 104 MMHG | OXYGEN SATURATION: 98 % | BODY MASS INDEX: 21.11 KG/M2 | WEIGHT: 164.5 LBS

## 2023-06-12 DIAGNOSIS — F43.10 PTSD (POST-TRAUMATIC STRESS DISORDER): ICD-10-CM

## 2023-06-12 DIAGNOSIS — F41.1 GAD (GENERALIZED ANXIETY DISORDER): Primary | ICD-10-CM

## 2023-06-12 DIAGNOSIS — M47.819 SPONDYLOARTHRITIS: ICD-10-CM

## 2023-06-12 DIAGNOSIS — J45.20 MILD INTERMITTENT ASTHMA WITHOUT COMPLICATION: ICD-10-CM

## 2023-06-12 PROBLEM — F41.0 PANIC ATTACK: Status: RESOLVED | Noted: 2020-10-06 | Resolved: 2023-06-12

## 2023-06-12 PROCEDURE — 96127 BRIEF EMOTIONAL/BEHAV ASSMT: CPT | Performed by: FAMILY MEDICINE

## 2023-06-12 PROCEDURE — 99214 OFFICE O/P EST MOD 30 MIN: CPT | Performed by: FAMILY MEDICINE

## 2023-06-12 RX ORDER — ESCITALOPRAM OXALATE 10 MG/1
TABLET ORAL
Qty: 30 TABLET | Refills: 1 | Status: SHIPPED | OUTPATIENT
Start: 2023-06-12 | End: 2023-06-12

## 2023-06-12 RX ORDER — ALPRAZOLAM 0.25 MG
0.25 TABLET ORAL 3 TIMES DAILY PRN
Qty: 60 TABLET | Refills: 1 | Status: CANCELLED | OUTPATIENT
Start: 2023-06-12

## 2023-06-12 RX ORDER — ALBUTEROL SULFATE 90 UG/1
2 AEROSOL, METERED RESPIRATORY (INHALATION) EVERY 6 HOURS PRN
Qty: 18 G | Refills: 5 | Status: SHIPPED | OUTPATIENT
Start: 2023-06-12 | End: 2024-03-19

## 2023-06-12 RX ORDER — LORAZEPAM 1 MG/1
TABLET ORAL
COMMUNITY
Start: 2023-05-12 | End: 2023-07-12

## 2023-06-12 ASSESSMENT — ANXIETY QUESTIONNAIRES
GAD7 TOTAL SCORE: 15
7. FEELING AFRAID AS IF SOMETHING AWFUL MIGHT HAPPEN: SEVERAL DAYS
3. WORRYING TOO MUCH ABOUT DIFFERENT THINGS: MORE THAN HALF THE DAYS
5. BEING SO RESTLESS THAT IT IS HARD TO SIT STILL: NEARLY EVERY DAY
GAD7 TOTAL SCORE: 15
2. NOT BEING ABLE TO STOP OR CONTROL WORRYING: SEVERAL DAYS
GAD7 TOTAL SCORE: 15
IF YOU CHECKED OFF ANY PROBLEMS ON THIS QUESTIONNAIRE, HOW DIFFICULT HAVE THESE PROBLEMS MADE IT FOR YOU TO DO YOUR WORK, TAKE CARE OF THINGS AT HOME, OR GET ALONG WITH OTHER PEOPLE: VERY DIFFICULT
4. TROUBLE RELAXING: NEARLY EVERY DAY
8. IF YOU CHECKED OFF ANY PROBLEMS, HOW DIFFICULT HAVE THESE MADE IT FOR YOU TO DO YOUR WORK, TAKE CARE OF THINGS AT HOME, OR GET ALONG WITH OTHER PEOPLE?: VERY DIFFICULT
7. FEELING AFRAID AS IF SOMETHING AWFUL MIGHT HAPPEN: SEVERAL DAYS
1. FEELING NERVOUS, ANXIOUS, OR ON EDGE: MORE THAN HALF THE DAYS
6. BECOMING EASILY ANNOYED OR IRRITABLE: NEARLY EVERY DAY

## 2023-06-12 ASSESSMENT — ASTHMA QUESTIONNAIRES
ACT_TOTALSCORE: 25
QUESTION_2 LAST FOUR WEEKS HOW OFTEN HAVE YOU HAD SHORTNESS OF BREATH: NOT AT ALL
ACT_TOTALSCORE: 25
QUESTION_1 LAST FOUR WEEKS HOW MUCH OF THE TIME DID YOUR ASTHMA KEEP YOU FROM GETTING AS MUCH DONE AT WORK, SCHOOL OR AT HOME: NONE OF THE TIME
QUESTION_3 LAST FOUR WEEKS HOW OFTEN DID YOUR ASTHMA SYMPTOMS (WHEEZING, COUGHING, SHORTNESS OF BREATH, CHEST TIGHTNESS OR PAIN) WAKE YOU UP AT NIGHT OR EARLIER THAN USUAL IN THE MORNING: NOT AT ALL
QUESTION_5 LAST FOUR WEEKS HOW WOULD YOU RATE YOUR ASTHMA CONTROL: COMPLETELY CONTROLLED
QUESTION_4 LAST FOUR WEEKS HOW OFTEN HAVE YOU USED YOUR RESCUE INHALER OR NEBULIZER MEDICATION (SUCH AS ALBUTEROL): NOT AT ALL

## 2023-06-12 ASSESSMENT — PAIN SCALES - GENERAL: PAINLEVEL: MILD PAIN (2)

## 2023-06-12 ASSESSMENT — PATIENT HEALTH QUESTIONNAIRE - PHQ9
SUM OF ALL RESPONSES TO PHQ QUESTIONS 1-9: 8
10. IF YOU CHECKED OFF ANY PROBLEMS, HOW DIFFICULT HAVE THESE PROBLEMS MADE IT FOR YOU TO DO YOUR WORK, TAKE CARE OF THINGS AT HOME, OR GET ALONG WITH OTHER PEOPLE: SOMEWHAT DIFFICULT
SUM OF ALL RESPONSES TO PHQ QUESTIONS 1-9: 8
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
## 2023-06-21 ENCOUNTER — MYC MEDICAL ADVICE (OUTPATIENT)
Dept: FAMILY MEDICINE | Facility: CLINIC | Age: 28
End: 2023-06-21
Payer: COMMERCIAL

## 2023-07-12 ENCOUNTER — OFFICE VISIT (OUTPATIENT)
Dept: FAMILY MEDICINE | Facility: CLINIC | Age: 28
End: 2023-07-12
Payer: COMMERCIAL

## 2023-07-12 DIAGNOSIS — S92.355D CLOSED NONDISPLACED FRACTURE OF FIFTH METATARSAL BONE OF LEFT FOOT WITH ROUTINE HEALING, SUBSEQUENT ENCOUNTER: ICD-10-CM

## 2023-07-12 DIAGNOSIS — F41.1 GAD (GENERALIZED ANXIETY DISORDER): ICD-10-CM

## 2023-07-12 DIAGNOSIS — F43.10 PTSD (POST-TRAUMATIC STRESS DISORDER): Primary | ICD-10-CM

## 2023-07-12 DIAGNOSIS — B35.3 TINEA PEDIS OF BOTH FEET: ICD-10-CM

## 2023-07-12 PROCEDURE — 99214 OFFICE O/P EST MOD 30 MIN: CPT | Performed by: FAMILY MEDICINE

## 2023-07-12 RX ORDER — CLOTRIMAZOLE 1 %
CREAM (GRAM) TOPICAL 2 TIMES DAILY
Qty: 85 G | Refills: 2 | Status: SHIPPED | OUTPATIENT
Start: 2023-07-12 | End: 2023-09-01

## 2023-07-12 RX ORDER — GABAPENTIN 600 MG/1
TABLET ORAL
COMMUNITY
Start: 2023-06-13 | End: 2023-07-12

## 2023-07-12 RX ORDER — LORAZEPAM 1 MG/1
.5-1 TABLET ORAL DAILY PRN
Qty: 25 TABLET | Refills: 0 | Status: SHIPPED | OUTPATIENT
Start: 2023-07-12 | End: 2023-08-16

## 2023-07-12 RX ORDER — HYDROXYZINE PAMOATE 25 MG/1
CAPSULE ORAL
COMMUNITY
Start: 2023-06-13 | End: 2023-08-16

## 2023-07-12 RX ORDER — LORAZEPAM 1 MG/1
1 TABLET ORAL DAILY PRN
Start: 2023-07-12 | End: 2023-07-12

## 2023-07-12 RX ORDER — CHOLECALCIFEROL (VITAMIN D3) 50 MCG
1 TABLET ORAL DAILY
Qty: 90 TABLET | Refills: 3 | Status: SHIPPED | OUTPATIENT
Start: 2023-07-12 | End: 2024-01-29

## 2023-07-12 RX ORDER — GABAPENTIN 600 MG/1
600 TABLET ORAL 2 TIMES DAILY
Qty: 60 TABLET | Refills: 2 | Status: SHIPPED | OUTPATIENT
Start: 2023-07-12 | End: 2023-09-01

## 2023-07-12 ASSESSMENT — ANXIETY QUESTIONNAIRES
5. BEING SO RESTLESS THAT IT IS HARD TO SIT STILL: NEARLY EVERY DAY
3. WORRYING TOO MUCH ABOUT DIFFERENT THINGS: MORE THAN HALF THE DAYS
IF YOU CHECKED OFF ANY PROBLEMS ON THIS QUESTIONNAIRE, HOW DIFFICULT HAVE THESE PROBLEMS MADE IT FOR YOU TO DO YOUR WORK, TAKE CARE OF THINGS AT HOME, OR GET ALONG WITH OTHER PEOPLE: VERY DIFFICULT
6. BECOMING EASILY ANNOYED OR IRRITABLE: NEARLY EVERY DAY
7. FEELING AFRAID AS IF SOMETHING AWFUL MIGHT HAPPEN: NEARLY EVERY DAY
GAD7 TOTAL SCORE: 16
4. TROUBLE RELAXING: NEARLY EVERY DAY
GAD7 TOTAL SCORE: 16
1. FEELING NERVOUS, ANXIOUS, OR ON EDGE: SEVERAL DAYS
2. NOT BEING ABLE TO STOP OR CONTROL WORRYING: SEVERAL DAYS

## 2023-07-12 NOTE — PROGRESS NOTES
Assessment/Plan.    PTSD. Poorly controlled.  -continue behavioral therapy  -at last visit, reviewed w/ Will that I would not recommend long-term benzo use for PTSD. We agreed to pursue a benzo taper at that time. Will continue lorazepam at 0.5-1 mg/d, decrease number of tabs prescribed from 30/month to 25/month. [place] prescription monitoring database reviewed today  -Will desires gabapentin taper. Will decrease from up to 1800 mg/d to 1200 mg/d  -continue prn hydroxyzine    Left 5th metatarsal fracture. Recurrence of pain after discontinuing boot use.  -Will interested in lighter boot. Encouraged him to discuss w/ [facility]    Athlete's foot.  -start topical clotrimazole    F/u in 1 month for PTSD. Consider trial of clonidine.    ----  Chief complaint: Recheck Medication    Social History     Social History Narrative    Updated 7/12/2023:  [place] up on [place].  Lives alone in [place].  Works as [email or web address].  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  .  Has 3 kids; custody fight with ex-partner.     Financial strain.  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  He is completing digital marketing courses through [email or web address].  Patient is also considering working as a medical , but he needs a car.    Girlfriend is pregnant.  Considering moving in with Will.    Left foot fracture.  Injured foot May 9.  Followed by [facility].  Patient reports that he was told he could stop using boot.  About 2 days ago, foot pain recurred.  He restarted using the boot.  He also tried MSM powder and vitamin K.    Hive-like rash on thighs yesterday.  Patient also had a lesion on his neck.  No throat or tongue swelling.  Patient thinks that the rash may have been secondary to cherry consumption.    Athlete's  foot.  Previously treated with an oral medication.  Symptoms have now recurred.    PTSD.  Patient continues to experience episodes of severe anxiety.  The timing of these is difficult to predict.  He continues to see a therapist (Cassandra Madsen) and may soon start EMDR.  He is prescribed gabapentin 3 times daily, but sometimes misses dose.  He uses hydroxyzine as needed.  He also uses lorazepam as needed.  Last lorazepam dose was earlier today.  Patient recently completed treatment program for cannabis, but he reports that he only attended this due to legal/custody requirements.  He does not feel that he has a cannabis problem.  He denies other recent illicit drug use.  He consumes less than 3 alcoholic beverages per week on average.    Exam  Calm, speech clear  Boot on left foot

## 2023-09-01 ENCOUNTER — VIRTUAL VISIT (OUTPATIENT)
Dept: FAMILY MEDICINE | Facility: CLINIC | Age: 28
End: 2023-09-01
Payer: COMMERCIAL

## 2023-09-01 DIAGNOSIS — F41.1 GAD (GENERALIZED ANXIETY DISORDER): ICD-10-CM

## 2023-09-01 DIAGNOSIS — F43.10 PTSD (POST-TRAUMATIC STRESS DISORDER): Primary | ICD-10-CM

## 2023-09-01 DIAGNOSIS — B35.3 TINEA PEDIS OF BOTH FEET: ICD-10-CM

## 2023-09-01 PROCEDURE — 99214 OFFICE O/P EST MOD 30 MIN: CPT | Mod: VID | Performed by: FAMILY MEDICINE

## 2023-09-01 RX ORDER — LORAZEPAM 1 MG/1
.5-1 TABLET ORAL DAILY PRN
Qty: 25 TABLET | Refills: 0 | Status: SHIPPED | OUTPATIENT
Start: 2023-09-01 | End: 2023-09-29

## 2023-09-01 RX ORDER — GABAPENTIN 600 MG/1
600 TABLET ORAL 3 TIMES DAILY
Qty: 90 TABLET | Refills: 2 | Status: SHIPPED | OUTPATIENT
Start: 2023-09-01 | End: 2023-09-29

## 2023-09-01 RX ORDER — CLOTRIMAZOLE 1 %
CREAM (GRAM) TOPICAL 2 TIMES DAILY
Qty: 85 G | Refills: 5 | Status: SHIPPED | OUTPATIENT
Start: 2023-09-01 | End: 2024-03-04

## 2023-09-01 NOTE — PROGRESS NOTES
"Assessment/Plan - Video Appointment.    PTSD, ANDRESSA. Poorly controlled.  -Will seems to be making a good effort to engage in non-pharmacologic interventions, such as yoga, behavioral therapy and deep breathing  -continue behavioral therapy  -increase gabapentin from 600 mg 2x/d back to 3x/d  -continue prn hydroxyzine  -continue prn lorazepam. We have been working on taper. We again discussed risk of dependence w/ benzos as well as the risk that long-term use of this med could exacerbate PTSD symptoms. Will would prefer not to taper lorazepam dose further today. Will continue 0.5-1 mg/d prn, 25 tablets per month prescribed. [place] prescription monitoring database reviewed today    F/u in 1 month.    ----  Chief complaint: Recheck Medication    Social History     Social History Narrative    Updated 7/12/2023:  [place] on [place] [place].  Lives alone in cond.  Works as [employer].  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  .  Has 3 kids; custody fight with ex-partner.     Life has been stressful lately:  -Girlfriend moved in. Relationship strain. She is 18-weeks pregnant. They are doing couples therapy.  -Limited interaction w/ kids. Waiting on more money to pay  for help with custody situation. Also needs money for supervised visits. 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    Foot fracture healed. Missed orthopedics appt. Now out of boot.    Mood up and down. \"Dark thoughts\" at times. Describes these as deep sorrow. Denies recent thoughts of harming self or others.    Panic attacks. 6d in past week.    Returned to yoga. Working on breathing techniques.    Seeing individual and couples therapist. Plans to start trauma therapy here in October.    Gabapentin ran out. Didn't check w/ pharmacy regarding refills. At last visit, we lowered dose to 2x/d. Pt thinks that 3x/d " worked better.    Using hydroxyzine 1-2x/d prn.    Using lorazepam most days. Denies excessive sedation when taking this. Denies taking more than prescribed or sharing med w/ others.    Some cannabis use. Otherwise denies recent drug use.    Exam  Affect mildly flat    Patient verbally consented to telehealth visit.  Location of patient: home. Location of provider: office.  Start time of conversation: 7.30am. End time of conversation: 8.05am.  Billing based on complexity of encounter.

## 2023-09-29 DIAGNOSIS — F43.10 PTSD (POST-TRAUMATIC STRESS DISORDER): ICD-10-CM

## 2023-09-29 NOTE — TELEPHONE ENCOUNTER
Requested Prescriptions   Pending Prescriptions Disp Refills    gabapentin (NEURONTIN) 600 MG tablet 90 tablet 2     Sig: Take 1 tablet (600 mg) by mouth 3 times daily       There is no refill protocol information for this order       LORazepam (ATIVAN) 1 MG tablet 25 tablet 0     Sig: Take 0.5-1 tablets (0.5-1 mg) by mouth daily as needed for anxiety       There is no refill protocol information for this order       Routing refill request to provider for review/approval because:  Drug not on the [facility] refill protocol     Noemy Lopez RN  [facility]

## 2023-09-29 NOTE — TELEPHONE ENCOUNTER
Pt called requesting refills.    Order and pharmacy have been pended. Pt states he missed his appointment today. Has been scheduled for 10/23/23. Thanks    cancer s

## 2023-10-02 ENCOUNTER — MYC REFILL (OUTPATIENT)
Dept: FAMILY MEDICINE | Facility: CLINIC | Age: 28
End: 2023-10-02
Payer: COMMERCIAL

## 2023-10-02 DIAGNOSIS — F43.10 PTSD (POST-TRAUMATIC STRESS DISORDER): ICD-10-CM

## 2023-10-02 RX ORDER — GABAPENTIN 600 MG/1
600 TABLET ORAL 3 TIMES DAILY
Qty: 90 TABLET | Refills: 1 | Status: SHIPPED | OUTPATIENT
Start: 2023-10-02 | End: 2023-10-23

## 2023-10-02 RX ORDER — LORAZEPAM 1 MG/1
.5-1 TABLET ORAL DAILY PRN
Qty: 20 TABLET | Refills: 0 | Status: SHIPPED | OUTPATIENT
Start: 2023-10-02 | End: 2023-10-23

## 2023-10-03 RX ORDER — LORAZEPAM 1 MG/1
.5-1 TABLET ORAL DAILY PRN
Qty: 20 TABLET | Refills: 0 | OUTPATIENT
Start: 2023-10-03

## 2023-10-16 ENCOUNTER — VIRTUAL VISIT (OUTPATIENT)
Dept: PSYCHOLOGY | Facility: CLINIC | Age: 28
End: 2023-10-16
Attending: FAMILY MEDICINE
Payer: COMMERCIAL

## 2023-10-16 DIAGNOSIS — F33.1 MAJOR DEPRESSIVE DISORDER, RECURRENT EPISODE, MODERATE WITH ANXIOUS DISTRESS (H): Primary | ICD-10-CM

## 2023-10-16 DIAGNOSIS — F41.1 GAD (GENERALIZED ANXIETY DISORDER): ICD-10-CM

## 2023-10-16 DIAGNOSIS — F43.10 POST TRAUMATIC STRESS DISORDER (PTSD): ICD-10-CM

## 2023-10-16 PROCEDURE — 90791 PSYCH DIAGNOSTIC EVALUATION: CPT | Mod: 95 | Performed by: SOCIAL WORKER

## 2023-10-16 ASSESSMENT — PATIENT HEALTH QUESTIONNAIRE - PHQ9
SUM OF ALL RESPONSES TO PHQ QUESTIONS 1-9: 19
10. IF YOU CHECKED OFF ANY PROBLEMS, HOW DIFFICULT HAVE THESE PROBLEMS MADE IT FOR YOU TO DO YOUR WORK, TAKE CARE OF THINGS AT HOME, OR GET ALONG WITH OTHER PEOPLE: EXTREMELY DIFFICULT
SUM OF ALL RESPONSES TO PHQ QUESTIONS 1-9: 19
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
## 2023-10-17 ASSESSMENT — COLUMBIA-SUICIDE SEVERITY RATING SCALE - C-SSRS
1. IN THE PAST MONTH, HAVE YOU WISHED YOU WERE DEAD OR WISHED YOU COULD GO TO SLEEP AND NOT WAKE UP?: YES
LETHALITY/MEDICAL DAMAGE CODE MOST RECENT POTENTIAL ATTEMPT: BEHAVIOR LIKELY TO RESULT IN INJURY BUT NOT LIKELY TO CAUSE DEATH
4. HAVE YOU HAD THESE THOUGHTS AND HAD SOME INTENTION OF ACTING ON THEM?: YES
TOTAL  NUMBER OF ABORTED OR SELF INTERRUPTED ATTEMPTS LIFETIME: NO
ATTEMPT PAST THREE MONTHS: NO
LETHALITY/MEDICAL DAMAGE CODE MOST LETHAL ACTUAL ATTEMPT: NO PHYSICAL DAMAGE OR VERY MINOR PHYSICAL DAMAGE
6. HAVE YOU EVER DONE ANYTHING, STARTED TO DO ANYTHING, OR PREPARED TO DO ANYTHING TO END YOUR LIFE?: NO
1. HAVE YOU WISHED YOU WERE DEAD OR WISHED YOU COULD GO TO SLEEP AND NOT WAKE UP?: YES
3. HAVE YOU BEEN THINKING ABOUT HOW YOU MIGHT KILL YOURSELF?: NO
REASONS FOR IDEATION LIFETIME: COMPLETELY TO END OR STOP THE PAIN (YOU COULDN'T GO ON LIVING WITH THE PAIN OR HOW YOU WERE FEELING)
ATTEMPT LIFETIME: YES
5. HAVE YOU STARTED TO WORK OUT OR WORKED OUT THE DETAILS OF HOW TO KILL YOURSELF? DO YOU INTEND TO CARRY OUT THIS PLAN?: NO
TOTAL  NUMBER OF ACTUAL ATTEMPTS LIFETIME: 1
4. HAVE YOU HAD THESE THOUGHTS AND HAD SOME INTENTION OF ACTING ON THEM?: NO
TOTAL  NUMBER OF INTERRUPTED ATTEMPTS LIFETIME: NO
REASONS FOR IDEATION PAST MONTH: DOES NOT APPLY
2. HAVE YOU ACTUALLY HAD ANY THOUGHTS OF KILLING YOURSELF?: YES
2. HAVE YOU ACTUALLY HAD ANY THOUGHTS OF KILLING YOURSELF?: NO
LETHALITY/MEDICAL DAMAGE CODE MOST RECENT ACTUAL ATTEMPT: NO PHYSICAL DAMAGE OR VERY MINOR PHYSICAL DAMAGE

## 2023-10-23 ENCOUNTER — VIRTUAL VISIT (OUTPATIENT)
Dept: FAMILY MEDICINE | Facility: CLINIC | Age: 28
End: 2023-10-23
Payer: COMMERCIAL

## 2023-10-23 DIAGNOSIS — F43.10 PTSD (POST-TRAUMATIC STRESS DISORDER): ICD-10-CM

## 2023-10-23 DIAGNOSIS — F90.9 ATTENTION DEFICIT HYPERACTIVITY DISORDER (ADHD), UNSPECIFIED ADHD TYPE: Primary | ICD-10-CM

## 2023-10-23 DIAGNOSIS — F41.1 GAD (GENERALIZED ANXIETY DISORDER): ICD-10-CM

## 2023-10-23 PROCEDURE — 99214 OFFICE O/P EST MOD 30 MIN: CPT | Mod: VID | Performed by: FAMILY MEDICINE

## 2023-10-23 RX ORDER — GABAPENTIN 600 MG/1
600 TABLET ORAL 3 TIMES DAILY
Qty: 90 TABLET | Refills: 1 | Status: SHIPPED | OUTPATIENT
Start: 2023-10-23 | End: 2023-12-20

## 2023-10-23 RX ORDER — LORAZEPAM 1 MG/1
.5-1 TABLET ORAL DAILY PRN
Qty: 20 TABLET | Refills: 0 | Status: SHIPPED | OUTPATIENT
Start: 2023-11-01 | End: 2023-12-20

## 2023-10-23 RX ORDER — LISDEXAMFETAMINE DIMESYLATE 10 MG/1
10 CAPSULE ORAL EVERY MORNING
Qty: 30 CAPSULE | Refills: 0 | Status: SHIPPED | OUTPATIENT
Start: 2023-10-23 | End: 2023-12-20
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
## 2023-11-03 ENCOUNTER — MYC REFILL (OUTPATIENT)
Dept: FAMILY MEDICINE | Facility: CLINIC | Age: 28
End: 2023-11-03
Payer: COMMERCIAL

## 2023-11-03 DIAGNOSIS — F43.10 PTSD (POST-TRAUMATIC STRESS DISORDER): ICD-10-CM

## 2023-11-03 RX ORDER — GABAPENTIN 600 MG/1
600 TABLET ORAL 3 TIMES DAILY
Qty: 90 TABLET | Refills: 1 | OUTPATIENT
Start: 2023-11-03

## 2023-11-07 ENCOUNTER — MYC REFILL (OUTPATIENT)
Dept: FAMILY MEDICINE | Facility: CLINIC | Age: 28
End: 2023-11-07
Payer: COMMERCIAL

## 2023-11-07 DIAGNOSIS — F43.10 PTSD (POST-TRAUMATIC STRESS DISORDER): ICD-10-CM

## 2023-11-07 RX ORDER — GABAPENTIN 600 MG/1
600 TABLET ORAL 3 TIMES DAILY
Qty: 90 TABLET | Refills: 1 | OUTPATIENT
Start: 2023-11-07

## 2023-12-20 ENCOUNTER — TELEPHONE (OUTPATIENT)
Dept: FAMILY MEDICINE | Facility: CLINIC | Age: 28
End: 2023-12-20

## 2023-12-20 ENCOUNTER — OFFICE VISIT (OUTPATIENT)
Dept: FAMILY MEDICINE | Facility: CLINIC | Age: 28
End: 2023-12-20
Payer: COMMERCIAL

## 2023-12-20 VITALS
RESPIRATION RATE: 18 BRPM | HEIGHT: 74 IN | DIASTOLIC BLOOD PRESSURE: 66 MMHG | WEIGHT: 164 LBS | BODY MASS INDEX: 21.05 KG/M2 | TEMPERATURE: 98.1 F | SYSTOLIC BLOOD PRESSURE: 98 MMHG | OXYGEN SATURATION: 99 % | HEART RATE: 73 BPM

## 2023-12-20 DIAGNOSIS — F90.9 ATTENTION DEFICIT HYPERACTIVITY DISORDER (ADHD), UNSPECIFIED ADHD TYPE: ICD-10-CM

## 2023-12-20 DIAGNOSIS — F41.1 GAD (GENERALIZED ANXIETY DISORDER): ICD-10-CM

## 2023-12-20 DIAGNOSIS — F43.10 PTSD (POST-TRAUMATIC STRESS DISORDER): Primary | ICD-10-CM

## 2023-12-20 PROCEDURE — 99214 OFFICE O/P EST MOD 30 MIN: CPT | Performed by: FAMILY MEDICINE

## 2023-12-20 RX ORDER — GABAPENTIN 600 MG/1
600 TABLET ORAL 3 TIMES DAILY
Qty: 90 TABLET | Refills: 1 | Status: SHIPPED | OUTPATIENT
Start: 2023-12-20 | End: 2024-01-29

## 2023-12-20 RX ORDER — LORAZEPAM 1 MG/1
.5-1 TABLET ORAL DAILY PRN
Qty: 20 TABLET | Refills: 0 | Status: SHIPPED | OUTPATIENT
Start: 2023-12-20 | End: 2024-01-22

## 2023-12-20 RX ORDER — LISDEXAMFETAMINE DIMESYLATE 20 MG/1
20 CAPSULE ORAL EVERY MORNING
Qty: 30 CAPSULE | Refills: 0 | Status: SHIPPED | OUTPATIENT
Start: 2023-12-20 | End: 2024-01-22

## 2023-12-20 RX ORDER — LISDEXAMFETAMINE DIMESYLATE 20 MG/1
20 CAPSULE ORAL EVERY MORNING
Qty: 30 CAPSULE | Refills: 0 | Status: SHIPPED | OUTPATIENT
Start: 2023-12-20 | End: 2023-12-20

## 2023-12-20 ASSESSMENT — ANXIETY QUESTIONNAIRES
GAD7 TOTAL SCORE: 15
6. BECOMING EASILY ANNOYED OR IRRITABLE: NEARLY EVERY DAY
IF YOU CHECKED OFF ANY PROBLEMS ON THIS QUESTIONNAIRE, HOW DIFFICULT HAVE THESE PROBLEMS MADE IT FOR YOU TO DO YOUR WORK, TAKE CARE OF THINGS AT HOME, OR GET ALONG WITH OTHER PEOPLE: EXTREMELY DIFFICULT
5. BEING SO RESTLESS THAT IT IS HARD TO SIT STILL: MORE THAN HALF THE DAYS
7. FEELING AFRAID AS IF SOMETHING AWFUL MIGHT HAPPEN: NOT AT ALL
GAD7 TOTAL SCORE: 15
4. TROUBLE RELAXING: MORE THAN HALF THE DAYS
2. NOT BEING ABLE TO STOP OR CONTROL WORRYING: NEARLY EVERY DAY
3. WORRYING TOO MUCH ABOUT DIFFERENT THINGS: NEARLY EVERY DAY
1. FEELING NERVOUS, ANXIOUS, OR ON EDGE: MORE THAN HALF THE DAYS

## 2023-12-20 ASSESSMENT — ASTHMA QUESTIONNAIRES: ACT_TOTALSCORE: 25

## 2023-12-20 ASSESSMENT — PATIENT HEALTH QUESTIONNAIRE - PHQ9
SUM OF ALL RESPONSES TO PHQ QUESTIONS 1-9: 13
10. IF YOU CHECKED OFF ANY PROBLEMS, HOW DIFFICULT HAVE THESE PROBLEMS MADE IT FOR YOU TO DO YOUR WORK, TAKE CARE OF THINGS AT HOME, OR GET ALONG WITH OTHER PEOPLE: EXTREMELY DIFFICULT
SUM OF ALL RESPONSES TO PHQ QUESTIONS 1-9: 13

## 2023-12-20 ASSESSMENT — PAIN SCALES - GENERAL: PAINLEVEL: MODERATE PAIN (4)

## 2023-12-20 NOTE — TELEPHONE ENCOUNTER
Refill request pended in a previous encounter.    Noemy Lopez RN  [facility]

## 2023-12-20 NOTE — COMMUNITY RESOURCES LIST (ENGLISH)
12/20/2023   [facility]  N/A  For questions about this resource list or additional care needs, please contact your primary care clinic or care manager.  Phone: [phone]   Email: N/A   Address: [facility] [place] [address]   Hours: N/A        Financial Stability       Rent and mortgage payment assistance  1  The Catie galeas [facility] - Rent Assistance Distance: 1.48 miles      In-Person   [address] [place] [address]  Language: English  Hours: Mon - Fri 9:00 AM - 5:00 PM  Fees: Free   Phone: (122) 677-3864 Email: info@maria del rosario.Aeromot Website: http://www.maria del rosario.Aeromot/     2  [facility] Distance: 1.54 miles      In-Person   [address] [place] [address]  Language: English  Hours: Mon - Fri 8:00 AM - 3:00 PM  Fees: Free   Phone: (624) 444-5265 Ext.14 Email: neighborhood@[facility] Website: http://www.[facility].org          Food and Nutrition       Food pantry  3  [facility] Food Shelf Distance: 0.39 miles      In-Person   [address] [place] [address]  Language: English, Norwegian  Hours: Mon 10:00 AM - 6:00 PM , Tue 9:00 PM - 5:00 PM , Thu 11:00 PM - 7:00 PM , Sat 9:00 AM - 2:00 PM  Fees: Free   Phone: (267) 635-1048 Email: info@StrangeLogic.org Website: http://www.Rail Yardodshelf.org/     4  [facility]CA Distance: 0.97 miles      Pick   [address] [place] [address]  Language: English  Hours: Mon - Fri 8:30 AM - 2:00 PM  Fees: Free   Phone: (998) 452-9712 Email: info@Cooking.com.org Website: http://www.[facility].org/locations/[facility]_ymca     SNAP application assistance  5  Comunidades Latinas Unidas En Servicio (CLUES) [facility] Distance: 1.49 miles      In-Person   [address] [place] [address]  Language: English, Norwegian  Hours: Mon - Fri 8:30 AM - 5:00 PM  Fees: Free   Phone: (535) 841-4419 Email: info@RecordSled.org Website: http://www.RecordSled.org/     6   [facility] Human Services and Public Health Department - Chemical Dependency Services (CDS) Distance: 1.7 miles      In-Person, Phone/Virtual   [address] [place] [address]  Language: English  Hours: Mon - Fri 9:00 AM - 9:00 PM  Fees: Free   Phone: (324) 547-1955 Website: https://www.[facility]/human-services/treatment-substance-use-disorders     Soup kitchen or free meals  7  [facility] / [facility] - Loaves and [facility] Distance: 0.97 miles      In-Person   [address] [place] [address]  Language: English  Hours: Mon - Fri 12:00 PM - 1:00 PM  Fees: Free   Phone: (309) 338-4096 Email: info@[facility].org Website: http://www.[facility].org/locations/[facility]son_[facility]     8  [facility] Distance: 1.06 miles      In-Person   [address] [place] [address]  Language: English  Hours: Sun 5:00 PM - 6:00 PM  Fees: Free   Phone: (530) 788-1235 Email: churchinfo@[place].[facility] Website: http://www.[place].org          Hotlines and Helplines       Hotline - Housing crisis  9  [facility] Distance: 0.43 miles      Phone/Virtual   [address] [place] [address]  Language: English  Hours: Mon - Sun Open 24 Hours   Phone: (282) 129-4385 Email: info@[facility].org Website: http://www.[facility].org     10  [facility] Distance: 1.61 miles      Phone/Virtual   [address] [place] [address]  Language: English  Hours: Mon - Sun Open 24 Hours   Phone: (133) 415-2733 Email: communications@oscs-[place].org Website: https://oscs-[place].org/ayaanourshousing/          Housing       Coordinated Entry access point  11  [facility] Hackermeter Service [facility] - Housing Services Distance: 1.4 miles      In-Person   [address] [place] [address]  Language: English  Hours: Mon - Fri 9:00 AM - 5:00 PM  Fees: Free   Phone: (195) 361-7390 Email: housing@[facility].org Website: http://www.[facility].org/[facility]     12  [facility]  [facility] - Adult Shelter [facility] Distance: 1.89 miles      In-Person   [address] [place] [address]  Language: English  Hours: Mon - Sat 10:00 PM - 5:00 PM  Fees: Free   Phone: (771) 455-3455 Email: info@saintolaf.org Website: http://www.saintolafG4S/     Drop-in center or day shelter  13  [facility] Distance: 1.49 miles      In-Person   [address] [place] [address]  Language: English  Hours: Mon - Fri 12:00 PM - 3:00 PM  Fees: Free   Phone: (986) 987-2114 Email: Gurnard Perch Sophisticated Technologies@OpenFeint.Diagnostic Biochips Website: http://Gurnard Perch Sophisticated Technologies.Filepicker.io/     14  [facility] Distance: 1.77 miles      In-Person   [address] [place] [address]  Language: English, Georgian, Sammarinese  Hours: Mon - Sat 7:00 AM - 3:00 PM  Fees: Free, Self Pay   Phone: (499) 946-5611 Email: info@Urge Website: https://www.Global Grind.org/locations/opportunity-center/     Housing search assistance  15  HousingLink - Online housing search assistance Distance: 1.94 miles      Phone/Blockchain   [address] [place] [address]  Language: English, Hmong, Georgian, Sammarinese  Hours: Mon - Sun Open 24 Hours   Phone: (205) 975-3927 Email: info@ClearPoint Metricslink.org Website: http://www.ClearPoint Metricslink.org/     16   Community Sportmeets (FonalityOR) Distance: 2.06 miles      In-Person   [address] [place] [address]  Language: English, Georgian, Sammarinese  Hours: Mon - Fri 8:30 AM - 4:30 PM  Fees: Free   Phone: (236) 672-9578 Email: bradley@[facility].org Website: http://www.[facility]mn.org/     Shelter for families  17  [facility] Distance: 20.39 miles      In-Person   [address] [facility] [place] [address]  Language: English  Hours: Mon - Fri 3:00 PM - 9:00 AM , Sat - Sun Open 24 Hours  Fees: Free   Phone: (873) 918-6308 Ext.1 Website:  https://www.saintandrews.org/2020/07/03/emergency-family-shelter/     Shelter for individuals  18  [facility] Distance: 1.61 miles      In-Person   [address] [place] [address]  Language: English  Hours: Mon - Sun Open 24 Hours  Fees: Free   Phone: (930) 304-3069 Email: communications@[facility].org Website: https://[facility].Senergen Devices/oursaviourshousing/     19  [facility] and [place] - Higher Ground snf - [place] Distance: 1.88 miles      In-Person   [address] [place] [address]  Language: English  Hours: Mon - Sun 5:00 PM - 10:00 AM  Fees: Free, Self Pay   Phone: (381) 877-3241 Email: info@Runa.Senergen Devices Website: https://www.Runa.Senergen Devices/locations/higher-ground-shelter/          Transportation       Free or low-cost transportation  20  [facility]us - [facility]beth - [place] Distance: 1.17 miles      In-Person   [address] [place] [address]  Language: English  Hours: Mon - Fri 9:00 AM - 12:00 PM , Mon - Fri 1:00 PM - 3:00 PM  Fees: Self Pay   Phone: (470) 481-6231 Email: info@EasyQasa.Senergen Devices Website: https://www.Your[facility]Online[facility] Community [facility] CenterNovatek/[place]     21  [facility] - Bus Passes - Free or low-cost transportation Distance: 1.48 miles      In-Person   [address] [place] [address]  Language: English  Hours: Tue 9:30 AM - 11:30 AM , Thu 9:30 AM - 11:30 AM  Fees: Free   Phone: (250) 343-9223 Email: info@Syandus Website: http://www.Syandus/     Transportation to medical appointments  22  [facility] Danielle Distance: 6.31 miles      In-Person   [address] [address]  Language: English  Hours: Mon - Sun Open 24 Hours  Fees: Insurance, Self Pay   Phone: (277) 220-5322 Website: https://www.SecureDB.PRUSLAND SL/danielle/?L=true     23  Assisted Transport Distance: 8.48 miles      In-Person   [address] [place] [address]  Language: English, Fijian  Hours: Mon - Sun Appt. Only  Fees: Self Pay    Phone: (359) 577-6986 Email: jude@Gigaclear Website: http://www.Experiment.com/          Important Numbers & Websites       Emergency Services   911  [facility] Services   311  Poison Control   (165) 826-4672  Suicide Prevention Lifeline   (820) 646-8835 (TALK)  Child Abuse Hotline   (339) 444-1852 (4-A-Child)  Sexual Assault Hotline   (856) 680-4661 (HOPE)  National Runaway Safeline   (792) 691-8253 (RUNAWAY)  All-Options Talkline   (210) 187-6658  Substance Abuse Referral   (873) 116-6219 (HELP)
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
## 2023-12-20 NOTE — COMMUNITY RESOURCES LIST (ENGLISH)
12/20/2023   [facility]  N/A  For questions about this resource list or additional care needs, please contact your primary care clinic or care manager.  Phone: [phone]   Email: N/A   Address: [facility] [place] [address]   Hours: N/A        Financial Stability       Rent and mortgage payment assistance  1  [facility] Catie [facility]aleas [facility] - Rent Assistance Distance: 1.48 miles      In-Person   [address] [place] [address]  Language: English  Hours: Mon - Fri 9:00 AM - 5:00 PM  Fees: Free   Phone: (144) 896-2960 Email: info@maria del rosario.Jebbit Website: http://www.maria del rosario.Jebbit/     2  [facility] Distance: 1.54 miles      In-Person   [address] [place] [address]  Language: English  Hours: Mon - Fri 8:00 AM - 3:00 PM  Fees: Free   Phone: (196) 377-6569 Ext.14 Email: neighborhood@[facility] Website: http://www.[facility].org          Food and Nutrition       Food pantry  3  [facility] Food Shelf Distance: 0.39 miles      In-Person   [address] [place] [address]  Language: English, British Virgin Islander  Hours: Mon 10:00 AM - 6:00 PM , Tue 9:00 PM - 5:00 PM , Thu 11:00 PM - 7:00 PM , Sat 9:00 AM - 2:00 PM  Fees: Free   Phone: (719) 683-6735 Email: info@Eyeota.org Website: http://www.AnTech Ltdodshelf.org/     4  [facility] Distance: 0.97 miles      Pick   [address] [place] [address]  Language: English  Hours: Mon - Fri 8:30 AM - 2:00 PM  Fees: Free   Phone: (294) 709-5341 Email: info@Haozu.com.org Website: http://www.GlobalMedia GroupP[facility].org/locations/[facility]_ymca     SNAP application assistance  5  Comunidades Latinas Unidas En Servicio (CLUES) [facility] Distance: 1.49 miles      In-Person   [address] [place] [address]  Language: English, British Virgin Islander  Hours: Mon - Fri 8:30 AM - 5:00 PM  Fees: Free   Phone: (699) 176-4216 Email: info@Clinical Innovations.org Website: http://www.Clinical Innovations.org/     6   [employer] Human Services and Public Health Department - Chemical Dependency Services (CDS) Distance: 1.7 miles      In-Person, Phone/Virtual   [address] [place] [address]  Language: English  Hours: Mon - Fri 9:00 AM - 9:00 PM  Fees: Free   Phone: (941) 374-7755 Website: https://www.[facility]/human-services/treatment-substance-use-disorders     Soup kitchen or free meals  7  [facility] - [place] - [facility] - Loaves and [facility] Distance: 0.97 miles      In-Person   [address] [place] [address]  Language: English  Hours: Mon - Fri 12:00 PM - 1:00 PM  Fees: Free   Phone: (712) 937-5837 Email: info@[facility]ments[facility].org Website: http://www.[facility].org/locations/[facility]son_[facility]     8  [facility] Distance: 1.06 miles      In-Person   [address] [place] [address]  Language: English  Hours: Sun 5:00 PM - 6:00 PM  Fees: Free   Phone: (622) 447-1625 Email: churchinfo@Stovall.[facility] Website: http://www.Stovall.org          Hotlines and Helplines       Hotline - Housing crisis  9  [facility] Distance: 0.43 miles      Phone/Virtual   [address] [place] [address]  Language: English  Hours: Mon - Sun Open 24 Hours   Phone: (159) 137-4147 Email: info@[facility] Biothe[facility].org Website: http://www.[facility].org     10  [facility] Distance: 1.61 miles      Phone/Virtual   [address] [place] [address]  Language: English  Hours: Mon - Sun Open 24 Hours   Phone: (623) 945-8995 Email: communications@oscs-mn.org Website: https://oscs-mn.org/ayaanourshousing/          Housing       Coordinated Entry access point  11  [facility] EDP Biotech Service [facility] ([facility]) - Housing Services Distance: 1.4 miles      In-Person   [address] [place] [address]  Language: English  Hours: Mon - Fri 9:00 AM - 5:00 PM  Fees: Free   Phone: (650) 914-9178 Email: housing@[facility].org Website: http://www.[facility].org/[facility]     12  [facility]  Spiritism Zoroastrian - Adult Shelter [facility] Distance: 1.89 miles      In-Person   [address] [place] [address]  Language: English  Hours: Mon - Sat 10:00 PM - 5:00 PM  Fees: Free   Phone: (297) 274-6855 Email: info@saintolaf.org Website: http://www.saintolafHomefront Learning Center/     Drop-in center or day shelter  13  [facility] Distance: 1.49 miles      In-Person   [address] [place] [address]  Language: English  Hours: Mon - Fri 12:00 PM - 3:00 PM  Fees: Free   Phone: (251) 731-3347 Email: Cold Futures@Thwapr.BackupAgent Website: http://Cold Futures.CayMay Education/     14  [facility] Distance: 1.77 miles      In-Person   [address] [place] [address]  Language: English, Palestinian, Peruvian  Hours: Mon - Sat 7:00 AM - 3:00 PM  Fees: Free, Self Pay   Phone: (700) 980-4525 Email: info@Badongo.com Website: https://www.Covaron Advanced Materials.org/locations/opportunity-center/     Housing search assistance  15  HousingLink - Online housing search assistance Distance: 1.94 miles      Phone/Zimplistic   [address] [place] [address]  Language: English, Hmong, Palestinian, Peruvian  Hours: Mon - Sun Open 24 Hours   Phone: (958) 252-5551 Email: info@VirtualSharp Softwarelink.org Website: http://www.VirtualSharp Softwarelink.org/     16   Community Focal Energy (Acuity SystemsNJ) Distance: 2.06 miles      In-Person   [address] [place] [address]  Language: English, Palestinian, Peruvian  Hours: Mon - Fri 8:30 AM - 4:30 PM  Fees: Free   Phone: (382) 250-7023 Email: bradley@[facility]mn.org Website: http://www.[facility]-mn.org/     Shelter for families  17  [facility] Distance: 20.39 miles      In-Person   [address] [facility] [place] [address]  Language: English  Hours: Mon - Fri 3:00 PM - 9:00 AM , Sat - Sun Open 24 Hours  Fees: Free   Phone: (431) 864-9446 Ext.1 Website:  https://www.saintandrews.org/2020/07/03/emergency-family-shelter/     Shelter for individuals  18  [facility] Distance: 1.61 miles      In-Person   [address] [place] [address]  Language: English  Hours: Mon - Sun Open 24 Hours  Fees: Free   Phone: (219) 335-6133 Email: communications@[facility].org Website: https://[facility].Yoyo/oursaviourshousing/     19  [facility] and [place] - Higher Ground residential - [place] Distance: 1.88 miles      In-Person   [address] [place] [address]  Language: English  Hours: Mon - Sun 5:00 PM - 10:00 AM  Fees: Free, Self Pay   Phone: (692) 282-5788 Email: info@Pricelock.Yoyo Website: https://www.Pricelock.Yoyo/locations/higher-ground-shelter/          Transportation       Free or low-cost transportation  20  Amicus - Volunteers of Marybeth - [place] Distance: 1.17 miles      In-Person   [address] [place] [address]  Language: English  Hours: Mon - Fri 9:00 AM - 12:00 PM , Mon - Fri 1:00 PM - 3:00 PM  Fees: Self Pay   Phone: (286) 933-7203 Email: info@Joroto.Yoyo Website: https://www.DaleeliEvangelical Community HospitalMatatena Games/[place]     21  [facility] - Bus Passes - Free or low-cost transportation Distance: 1.48 miles      In-Person   [address] [place] [address]  Language: English  Hours: Tue 9:30 AM - 11:30 AM , Thu 9:30 AM - 11:30 AM  Fees: Free   Phone: (253) 492-5358 Email: info@Pharmaca Website: http://www.Pharmaca/     Transportation to medical appointments  22  [facility] Danielle Distance: 6.31 miles      In-Person   [address] [address]  Language: English  Hours: Mon - Sun Open 24 Hours  Fees: Insurance, Self Pay   Phone: (883) 516-1685 Website: https://www.Beat.no.American Health Supplies/danielle/?L=true     23  Assisted Transport Distance: 8.48 miles      In-Person   [address] [facility] [place] [address]  Language: English, Malawian  Hours: Mon - Sun Appt. Only  Fees: Self Pay    Phone: (333) 893-4087 Email: jude@Pandoo TEK Website: http://www.Vault Dragon.com/          Important Numbers & Websites       Emergency Services   911  [facility] Services   311  Poison Control   (885) 403-5292  Suicide Prevention Lifeline   (162) 530-8151 (TALK)  Child Abuse Hotline   (477) 560-6965 (4-A-Child)  Sexual Assault Hotline   (174) 510-4748 (HOPE)  National Runaway Safeline   (641) 998-6849 (RUNAWAY)  All-Options Talkline   (579) 268-5512  Substance Abuse Referral   (391) 221-2325 (HELP)

## 2023-12-20 NOTE — PATIENT INSTRUCTIONS
Higher dose of Vyvanse. Watch out for side effects: low appetite, trouble falling asleep, racing heart.    Refills of lorazepam and gabapentin. Only take lorazepam when the anxiety is severe.    Let's meet again in a month.

## 2023-12-20 NOTE — PROGRESS NOTES
{PROVIDER CHARTING PREFERENCE:604725}    Subjective   Will is a 28 year old, presenting for the following health issues:  Recheck Medication, Back Pain, and MH Follow Up        12/20/2023     1:33 PM   Additional Questions   Roomed by Guillermo STOVER       History of Present Illness       Back Pain:  He presents for follow up of back pain. Patient's back pain is a chronic problem.  Location of back pain:  Right lower back, left lower back, right middle of back, left middle of back, right upper back, left upper back, right side of neck and left side of neck  Description of back pain: dull ache  Back pain spreads: right side of neck and left side of neck    Since patient first noticed back pain, pain is: always present, but gets better and worse  Does back pain interfere with his job:  Yes       Mental Health Follow-up:  Patient presents to follow-up on Depression & Anxiety.Patient's depression since last visit has been:  Better  The patient is having other symptoms associated with depression.  Patient's anxiety since last visit has been:  Bad  The patient is having other symptoms associated with anxiety.  Any significant life events: relationship concerns, job concerns, financial concerns and health concerns  Patient is feeling anxious or having panic attacks.  Patient has no concerns about alcohol or drug use.    He eats 0-1 servings of fruits and vegetables daily.He consumes 2 sweetened beverage(s) daily.He exercises with enough effort to increase his heart rate 10 to 19 minutes per day.  He exercises with enough effort to increase his heart rate 4 days per week.   He is not taking prescribed medications regularly due to other.       {MA/LPN/RN Pre-Provider Visit Orders- hCG/UA/Strep (Optional):063262}    {SUPERLIST (Optional):859892}    {additonal problems for provider to add (Optional):023982}      Review of Systems   {ROS COMP (Optional):359254}      Objective    BP 98/66 (BP Location: Left arm, Patient Position:  "Sitting, Cuff Size: Adult Regular)   Pulse 73   Temp 98.1  F (36.7  C) (Temporal)   Resp 18   Ht 1.892 m (6' 2.49\")   Wt 74.4 kg (164 lb)   SpO2 99%   BMI 20.78 kg/m    Body mass index is 20.78 kg/m .  Physical Exam   {Exam List (Optional):137160}    {Diagnostic Test Results (Optional):682957}    {AMBULATORY ATTESTATION (Optional):053383}              "

## 2023-12-20 NOTE — TELEPHONE ENCOUNTER
GENEVIEVEG.,  Change in pharmacy request  Due to shortage  Pended if you approve  Thanks,  Kika DOMINIQUE RN

## 2024-01-03 ENCOUNTER — MYC REFILL (OUTPATIENT)
Dept: FAMILY MEDICINE | Facility: CLINIC | Age: 29
End: 2024-01-03
Payer: COMMERCIAL

## 2024-01-03 DIAGNOSIS — F43.10 PTSD (POST-TRAUMATIC STRESS DISORDER): ICD-10-CM

## 2024-01-04 RX ORDER — GABAPENTIN 600 MG/1
600 TABLET ORAL 3 TIMES DAILY
Qty: 90 TABLET | Refills: 1 | OUTPATIENT
Start: 2024-01-04

## 2024-01-18 ENCOUNTER — OFFICE VISIT (OUTPATIENT)
Dept: RHEUMATOLOGY | Facility: CLINIC | Age: 29
End: 2024-01-18
Payer: COMMERCIAL

## 2024-01-18 ENCOUNTER — LAB (OUTPATIENT)
Dept: LAB | Facility: CLINIC | Age: 29
End: 2024-01-18
Payer: COMMERCIAL

## 2024-01-18 VITALS
OXYGEN SATURATION: 100 % | HEART RATE: 56 BPM | DIASTOLIC BLOOD PRESSURE: 60 MMHG | SYSTOLIC BLOOD PRESSURE: 90 MMHG | WEIGHT: 164.5 LBS | BODY MASS INDEX: 20.84 KG/M2

## 2024-01-18 DIAGNOSIS — G89.29 CHRONIC BILATERAL LOW BACK PAIN WITHOUT SCIATICA: Primary | ICD-10-CM

## 2024-01-18 DIAGNOSIS — M54.50 CHRONIC BILATERAL LOW BACK PAIN WITHOUT SCIATICA: Primary | ICD-10-CM

## 2024-01-18 DIAGNOSIS — G89.29 CHRONIC BILATERAL LOW BACK PAIN WITHOUT SCIATICA: ICD-10-CM

## 2024-01-18 DIAGNOSIS — Z15.89 HLA B27 POSITIVE: ICD-10-CM

## 2024-01-18 DIAGNOSIS — M54.50 CHRONIC BILATERAL LOW BACK PAIN WITHOUT SCIATICA: ICD-10-CM

## 2024-01-18 LAB
ERYTHROCYTE [DISTWIDTH] IN BLOOD BY AUTOMATED COUNT: 14 % (ref 10–15)
ERYTHROCYTE [SEDIMENTATION RATE] IN BLOOD BY WESTERGREN METHOD: 7 MM/HR (ref 0–15)
HCT VFR BLD AUTO: 39.7 % (ref 40–53)
HGB BLD-MCNC: 13 G/DL (ref 13.3–17.7)
MCH RBC QN AUTO: 28.4 PG (ref 26.5–33)
MCHC RBC AUTO-ENTMCNC: 32.7 G/DL (ref 31.5–36.5)
MCV RBC AUTO: 87 FL (ref 78–100)
PLATELET # BLD AUTO: 200 10E3/UL (ref 150–450)
RBC # BLD AUTO: 4.58 10E6/UL (ref 4.4–5.9)
WBC # BLD AUTO: 6.3 10E3/UL (ref 4–11)

## 2024-01-18 PROCEDURE — 82040 ASSAY OF SERUM ALBUMIN: CPT

## 2024-01-18 PROCEDURE — 86140 C-REACTIVE PROTEIN: CPT

## 2024-01-18 PROCEDURE — 84460 ALANINE AMINO (ALT) (SGPT): CPT

## 2024-01-18 PROCEDURE — 99214 OFFICE O/P EST MOD 30 MIN: CPT | Performed by: PHYSICIAN ASSISTANT

## 2024-01-18 PROCEDURE — 84450 TRANSFERASE (AST) (SGOT): CPT

## 2024-01-18 PROCEDURE — 85652 RBC SED RATE AUTOMATED: CPT

## 2024-01-18 PROCEDURE — 36415 COLL VENOUS BLD VENIPUNCTURE: CPT

## 2024-01-18 PROCEDURE — 82565 ASSAY OF CREATININE: CPT

## 2024-01-18 PROCEDURE — 85027 COMPLETE CBC AUTOMATED: CPT

## 2024-01-18 NOTE — PATIENT INSTRUCTIONS
After Visit Instructions:     Thank you for coming to [facility] for your care. It is my goal to partner with you to help you reach your optimal state of health.       Plan:     Schedule follow-up with Kami Noble PA-C in 3 months.   Imaging: MRI lumbar spine and SI joints  Labs: CRP and Sed Rate, CBC, creatinine, Albumin, AST, ALT  Medication recommendations:   Consider Sulfasalazine or Humira depending on results of the MRI  # Sulfasalazine Risks and Benefits: The risks and benefits of sulfasalazine were discussed in detail and the patient verbalized understanding.  The risks discussed include, but are not limited to, the risk for hypersensitivity, anaphylaxis, anaphylactoid reactions, infections, bone marrow suppression,  hepatotoxicity, nausea, vomiting, and GI upset.  Oligospermia may occur in males.  I encouraged reviewing the package insert and asking any questions about the medication.    # Adalimumab (Humira) Risks and Benefits: The risks and benefits of adalimumab were discussed in detail and the patient verbalized understanding.  The risks discussed include, but are not limited to, the risk for hypersensitivity, anaphylaxis, anaphylactoid reactions, an increased risk for serious infections leading to hospitalization or death, a possible increased risk for lymphoma and other malignancies, a possible worsening of demyelinating diseases, a possible worsening of heart failure, risk for cytopenias, risk for drug induced lupus, possible reactivation of hepatitis B, and possible reactivation of latent tuberculosis.  Subcutaneous injections may result in injection site reactions and/or pain at the site of injection.  The most common adverse reactions are infections, injection site reactions, headache, and rash.  It was discussed that the medication would need to be discontinued if a serious infection develops.  It was discussed that live vaccinations should not be received while using  adalimumab or within 30 days prior to starting adalimumab.  I encouraged reviewing the package insert and asking any questions about the medication.        Kami Noble PA-C  [facility]  [place]    Contact information: [facility]  Clinic Number:  [phone]  Please call or send a Smarp message with any questions about your care
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
## 2024-01-19 LAB
ALBUMIN SERPL BCG-MCNC: 4.1 G/DL (ref 3.5–5.2)
ALT SERPL W P-5'-P-CCNC: 16 U/L (ref 0–70)
AST SERPL W P-5'-P-CCNC: 22 U/L (ref 0–45)
CREAT SERPL-MCNC: 0.73 MG/DL (ref 0.67–1.17)
CRP SERPL-MCNC: <3 MG/L
EGFRCR SERPLBLD CKD-EPI 2021: >90 ML/MIN/1.73M2

## 2024-01-25 ENCOUNTER — MYC REFILL (OUTPATIENT)
Dept: FAMILY MEDICINE | Facility: CLINIC | Age: 29
End: 2024-01-25
Payer: COMMERCIAL

## 2024-01-25 DIAGNOSIS — F43.10 PTSD (POST-TRAUMATIC STRESS DISORDER): ICD-10-CM

## 2024-01-25 DIAGNOSIS — F90.9 ATTENTION DEFICIT HYPERACTIVITY DISORDER (ADHD), UNSPECIFIED ADHD TYPE: ICD-10-CM

## 2024-01-25 RX ORDER — LORAZEPAM 1 MG/1
.5-1 TABLET ORAL DAILY PRN
Qty: 5 TABLET | Refills: 0 | OUTPATIENT
Start: 2024-01-25

## 2024-01-25 RX ORDER — LISDEXAMFETAMINE DIMESYLATE 20 MG/1
20 CAPSULE ORAL EVERY MORNING
Qty: 7 CAPSULE | Refills: 0 | OUTPATIENT
Start: 2024-01-25

## 2024-01-26 ENCOUNTER — TELEPHONE (OUTPATIENT)
Dept: FAMILY MEDICINE | Facility: CLINIC | Age: 29
End: 2024-01-26
Payer: COMMERCIAL

## 2024-01-26 NOTE — TELEPHONE ENCOUNTER
Reason for Call:  Form, our goal is to have forms completed with 72 hours, however, some forms may require a visit or additional information.    Type of letter, form or note:  disability    Who is the form from?: Patient    Where did the form come from: form was faxed in    What clinic location was the form placed at?: [facility]    Where the form was placed:   Box/Folder    What number is listed as a contact on the form?:   [phone]       Additional comments:     Call taken on 1/26/2024 at 8:20 AM by Kika Cantrell

## 2024-01-29 ENCOUNTER — VIRTUAL VISIT (OUTPATIENT)
Dept: FAMILY MEDICINE | Facility: CLINIC | Age: 29
End: 2024-01-29
Attending: FAMILY MEDICINE
Payer: COMMERCIAL

## 2024-01-29 DIAGNOSIS — F33.2 SEVERE EPISODE OF RECURRENT MAJOR DEPRESSIVE DISORDER, WITHOUT PSYCHOTIC FEATURES (H): ICD-10-CM

## 2024-01-29 DIAGNOSIS — F90.9 ATTENTION DEFICIT HYPERACTIVITY DISORDER (ADHD), UNSPECIFIED ADHD TYPE: ICD-10-CM

## 2024-01-29 DIAGNOSIS — F43.10 PTSD (POST-TRAUMATIC STRESS DISORDER): Primary | ICD-10-CM

## 2024-01-29 PROCEDURE — 99214 OFFICE O/P EST MOD 30 MIN: CPT | Mod: 95 | Performed by: FAMILY MEDICINE

## 2024-01-29 RX ORDER — CHOLECALCIFEROL (VITAMIN D3) 50 MCG
1 TABLET ORAL DAILY
Qty: 90 TABLET | Refills: 3 | Status: SHIPPED | OUTPATIENT
Start: 2024-01-29 | End: 2024-03-19

## 2024-01-29 RX ORDER — LISDEXAMFETAMINE DIMESYLATE 30 MG/1
30 CAPSULE ORAL EVERY MORNING
Qty: 30 CAPSULE | Refills: 0 | Status: SHIPPED | OUTPATIENT
Start: 2024-01-29 | End: 2024-02-28

## 2024-01-29 RX ORDER — GABAPENTIN 600 MG/1
600 TABLET ORAL 3 TIMES DAILY
Qty: 90 TABLET | Refills: 1 | Status: SHIPPED | OUTPATIENT
Start: 2024-01-29 | End: 2024-02-28

## 2024-01-29 RX ORDER — LORAZEPAM 1 MG/1
.5-1 TABLET ORAL DAILY PRN
Qty: 20 TABLET | Refills: 0 | Status: SHIPPED | OUTPATIENT
Start: 2024-01-29 | End: 2024-02-28

## 2024-01-29 RX ORDER — HYDROXYZINE PAMOATE 25 MG/1
25 CAPSULE ORAL 3 TIMES DAILY PRN
Qty: 90 CAPSULE | Refills: 1 | Status: SHIPPED | OUTPATIENT
Start: 2024-01-29

## 2024-01-29 NOTE — PROGRESS NOTES
"Assessment/Plan - Video Appointment.    PTSD, depression. Lots of life stressors, many of which are beyond Will's control.  -continue behavioral therapy, yoga  -continue gabapentin, prn hydroxyzine, prn lorazepam (20 tabs/month). [place] prescription monitoring database reviewed today    ADHD. Improving.  -increase Vyvanse from 20 to 30 mg/d    F/u in 1 month.      ----  Chief complaint: Recheck Medication    Social History     Social History Narrative    -[place] on [facility]    -Lives alone in condo    -Significant other is due 1/2024    -    -Has 3 kids. 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        Updated 1/30/2024     Relationship strain w/ MOB  -due date soon, expecting daughter Ernesto)  -MOB kicks him out of apt  -pt is financing MOB's apt and can't afford his own apt    Applying for unsupervised visitation w/ kids from other mother  Would save pt a lot of money    Plans to put stuff in storage soon  Will attempt to rent room or move in w/ friend    Taking yoga and digital marketing classes  Met w/ temp agency re- jobs    Vyvanse dose increase at last visit  Helps w/ anxiety, focus, time perception/organization  Takes it at 7-8am, effects fade around 3pm  Denies appetite issues, palpitations, insomnia  Using weed, otherwise denies recent substance use    Continues to use prn hydroxyzine and lorazepam  Will suspects he wouldn't need lorazepam if he could acquire medical cannabis again   -cost is a barrier    Denies other recent med changes    On detox program with spirulina smoothies  Taking \"amino\" supplements    Therapist has been cancelling some visits recently    Exam  Affect normal.    Patient verbally consented to telehealth visit.  Location of patient: [place]. Location of provider: [place].  Start time of conversation: 5.36pm. End time of conversation: 6.05pm.  Billing based on " complexity of encounter.

## 2024-02-28 ENCOUNTER — VIRTUAL VISIT (OUTPATIENT)
Dept: FAMILY MEDICINE | Facility: CLINIC | Age: 29
End: 2024-02-28
Payer: COMMERCIAL

## 2024-02-28 DIAGNOSIS — F43.10 PTSD (POST-TRAUMATIC STRESS DISORDER): ICD-10-CM

## 2024-02-28 DIAGNOSIS — F90.9 ATTENTION DEFICIT HYPERACTIVITY DISORDER (ADHD), UNSPECIFIED ADHD TYPE: Primary | ICD-10-CM

## 2024-02-28 PROCEDURE — 99214 OFFICE O/P EST MOD 30 MIN: CPT | Mod: 95 | Performed by: FAMILY MEDICINE

## 2024-02-28 RX ORDER — GABAPENTIN 600 MG/1
600 TABLET ORAL 3 TIMES DAILY
Qty: 90 TABLET | Refills: 1 | Status: SHIPPED | OUTPATIENT
Start: 2024-02-28 | End: 2024-03-05

## 2024-02-28 RX ORDER — LISDEXAMFETAMINE DIMESYLATE 30 MG/1
30 CAPSULE ORAL EVERY MORNING
Qty: 30 CAPSULE | Refills: 0 | Status: SHIPPED | OUTPATIENT
Start: 2024-02-28 | End: 2024-03-27

## 2024-02-28 RX ORDER — LORAZEPAM 1 MG/1
.5-1 TABLET ORAL DAILY PRN
Qty: 20 TABLET | Refills: 0 | Status: SHIPPED | OUTPATIENT
Start: 2024-02-28 | End: 2024-03-27

## 2024-02-28 RX ORDER — DEXTROAMPHETAMINE SACCHARATE, AMPHETAMINE ASPARTATE, DEXTROAMPHETAMINE SULFATE AND AMPHETAMINE SULFATE 1.25; 1.25; 1.25; 1.25 MG/1; MG/1; MG/1; MG/1
5 TABLET ORAL DAILY
Qty: 30 TABLET | Refills: 0 | Status: SHIPPED | OUTPATIENT
Start: 2024-02-28 | End: 2024-03-27

## 2024-02-28 RX ORDER — GABAPENTIN 600 MG/1
600 TABLET ORAL 3 TIMES DAILY
Qty: 90 TABLET | Refills: 1 | Status: SHIPPED | OUTPATIENT
Start: 2024-02-28 | End: 2024-02-28

## 2024-02-28 NOTE — PROGRESS NOTES
Assessment/Plan - Video Appointment.    ADHD. Improving.  -continue Vyvanse at 30 mg/d  -add afternoon Adderall IR 5 mg/d    Anxiety. Improving.  -continue gabapentin  -I advised resuming lorazepam taper due to risk of dependence. Will prefers to wait another month. Will continue 20 tabs/month for now. [place] prescription monitoring database reviewed today    F/u in 1 month.      ----  Chief complaint: Recheck Medication    Social History     Social History Narrative    -[place] [address] [facility]    -Lives with girlfriend and daughter    -    -Has 4 kids, 3 with ex-partner. In custody fight with ex-partner    -Works as [employer].  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 Giorgio. Healthy. Pt now living w/ girlfriend.    Spondyloarthritis. Saw rheum. Declined DMARD. MRI scheduled. Wants to try turmeric and DMSO.    ADHD. Vyvanse upped at last visit. Helpful. Overall, less anxiety and feeling less depressed. Takes Vyvanse around 7-8am. Effects fade around 2-3pm. Denies adverse effects. Using planner.    Continues gabapentin for anxiety. Also prn lorazepam; none left.    No med changes since last visit, except iron and C supplement for finger laceration.    Some wine and cannabis use since last visit.    Exam  Affect normal    Patient verbally consented to telehealth visit.  Location of patient: [place]. Location of provider: [place].  Start time of conversation: 10.20am. End time of conversation: 10.50am.  Billing based on complexity of encounter.

## 2024-03-19 ENCOUNTER — MYC REFILL (OUTPATIENT)
Dept: FAMILY MEDICINE | Facility: CLINIC | Age: 29
End: 2024-03-19
Payer: COMMERCIAL

## 2024-03-19 DIAGNOSIS — J45.20 MILD INTERMITTENT ASTHMA WITHOUT COMPLICATION: ICD-10-CM

## 2024-03-19 DIAGNOSIS — B35.3 TINEA PEDIS OF BOTH FEET: ICD-10-CM

## 2024-03-19 RX ORDER — CLOTRIMAZOLE 1 %
CREAM (GRAM) TOPICAL 2 TIMES DAILY
Qty: 85 G | Refills: 2 | OUTPATIENT
Start: 2024-03-19

## 2024-03-19 RX ORDER — ALBUTEROL SULFATE 90 UG/1
2 AEROSOL, METERED RESPIRATORY (INHALATION) EVERY 6 HOURS PRN
Qty: 18 G | Refills: 5 | Status: SHIPPED | OUTPATIENT
Start: 2024-03-19

## 2024-03-27 ENCOUNTER — VIRTUAL VISIT (OUTPATIENT)
Dept: FAMILY MEDICINE | Facility: CLINIC | Age: 29
End: 2024-03-27
Payer: COMMERCIAL

## 2024-03-27 DIAGNOSIS — F43.10 PTSD (POST-TRAUMATIC STRESS DISORDER): ICD-10-CM

## 2024-03-27 DIAGNOSIS — F90.9 ATTENTION DEFICIT HYPERACTIVITY DISORDER (ADHD), UNSPECIFIED ADHD TYPE: Primary | ICD-10-CM

## 2024-03-27 DIAGNOSIS — F41.1 GAD (GENERALIZED ANXIETY DISORDER): ICD-10-CM

## 2024-03-27 PROCEDURE — 99214 OFFICE O/P EST MOD 30 MIN: CPT | Mod: 95 | Performed by: FAMILY MEDICINE

## 2024-03-27 RX ORDER — DEXTROAMPHETAMINE SACCHARATE, AMPHETAMINE ASPARTATE, DEXTROAMPHETAMINE SULFATE AND AMPHETAMINE SULFATE 1.25; 1.25; 1.25; 1.25 MG/1; MG/1; MG/1; MG/1
5 TABLET ORAL DAILY
Qty: 30 TABLET | Refills: 0 | Status: SHIPPED | OUTPATIENT
Start: 2024-03-27 | End: 2024-05-16

## 2024-03-27 RX ORDER — LORAZEPAM 1 MG/1
.5-1 TABLET ORAL DAILY PRN
Qty: 18 TABLET | Refills: 0 | Status: SHIPPED | OUTPATIENT
Start: 2024-03-27 | End: 2024-05-16

## 2024-03-27 RX ORDER — LISDEXAMFETAMINE DIMESYLATE 40 MG/1
40 CAPSULE ORAL EVERY MORNING
Qty: 30 CAPSULE | Refills: 0 | Status: SHIPPED | OUTPATIENT
Start: 2024-03-27 | End: 2024-04-02
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
## 2024-04-24 ENCOUNTER — HOSPITAL ENCOUNTER (EMERGENCY)
Facility: CLINIC | Age: 29
Discharge: HOME | End: 2024-04-24
Attending: EMERGENCY MEDICINE | Admitting: EMERGENCY MEDICINE
Payer: COMMERCIAL

## 2024-04-24 ENCOUNTER — APPOINTMENT (OUTPATIENT)
Dept: GENERAL RADIOLOGY | Facility: CLINIC | Age: 29
End: 2024-04-24
Attending: EMERGENCY MEDICINE
Payer: COMMERCIAL

## 2024-04-24 VITALS
HEART RATE: 73 BPM | OXYGEN SATURATION: 100 % | WEIGHT: 155 LBS | RESPIRATION RATE: 16 BRPM | TEMPERATURE: 98.6 F | SYSTOLIC BLOOD PRESSURE: 116 MMHG | DIASTOLIC BLOOD PRESSURE: 58 MMHG | BODY MASS INDEX: 19.89 KG/M2 | HEIGHT: 74 IN

## 2024-04-24 DIAGNOSIS — S69.92XA WRIST INJURY, LEFT, INITIAL ENCOUNTER: ICD-10-CM

## 2024-04-24 PROCEDURE — 29125 APPL SHORT ARM SPLINT STATIC: CPT | Mod: LT

## 2024-04-24 PROCEDURE — 99284 EMERGENCY DEPT VISIT MOD MDM: CPT

## 2024-04-24 PROCEDURE — 73110 X-RAY EXAM OF WRIST: CPT | Mod: LT

## 2024-04-24 ASSESSMENT — ACTIVITIES OF DAILY LIVING (ADL): ADLS_ACUITY_SCORE: 35
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
## 2024-04-24 NOTE — ED TRIAGE NOTES
"Left wrist pain from wrestling with friend. Hx of arthritis, per pt \"fragile bones\"      Triage Assessment (Adult)       Row Name 04/24/24 1300          Triage Assessment    Airway WDL WDL        Cognitive/Neuro/Behavioral WDL    Cognitive/Neuro/Behavioral WDL WDL                     "

## 2024-04-24 NOTE — ED PROVIDER NOTES
"  History     Chief Complaint:  Wrist Pain       HPI   Hernandez Garcia is a 28 year old male who presents with wrist pain. The patient reports that he injured his left wrist 3 weeks ago while wrestling with a friend. He explains that he has history of arthritis so it takes a long time for injuries to heal for him. The patient notes that he has an appointment with [facility] already scheduled for CT scans of the region. He takes meloxicam for pain. He is right hand dominant.      Independent Historian:   None - Patient Only      Medications:    Proair  Adderall  Meloxicam  Neurontin  Vistaril  Vyvanse  Ativan    Past Medical History:    ADHD  Anxiety  Depression  PTSD  Asthma  Chronic low back pain  Arthritis    Physical Exam   Patient Vitals for the past 24 hrs:   BP Temp Temp src Pulse Resp SpO2 Height Weight   04/24/24 1257 116/58 98.6  F (37  C) Temporal 73 16 100 % 1.88 m (6' 2\") 70.3 kg (155 lb)        Physical Exam  Eyes:  The pupils are equal and round    Conjunctivae and sclerae are normal  ENT:    The nose is normal    Pinnae are normal  CV:  Regular rate and rhythm     No edema  Resp:  Normal respiratory effort    Speaks in full sentences  MS:  Normal muscular tone    No asymmetric leg swelling    Tenderness of the dorsum of the left wrist    No swelling. No erythema.    Full ROM of fingers of the left hand  Skin:  No rash or acute skin lesions noted  Neuro:   Awake, alert.      Speech is normal and fluent.    Face is symmetric.     Moves all extremities    Emergency Department Course     Imaging:  XR Wrist Left G/E 3 Views   Final Result   Impression:      1.  Negative left wrist. Normal joint alignment. No fracture   identified.      MONO TATE MD            SYSTEM ID:  [id number]           Emergency Department Course & Assessments:    Interventions:  Medications - No data to display     Assessments:  1331 I obtained history and examined the patient as noted " above    Independent Interpretation (X-rays, CTs, rhythm strip):  XR Wrist Left: Normal    Consultations/Discussion of Management or Tests:  None        Social Determinants of Health affecting care:   None    Disposition:  The patient was discharged.     Impression & Plan    CMS Diagnoses: None       Medical Decision Making:  Hernandez Garcia is a 28 year old male who presents with left wrist pain.  Has been hurting for 2 to 3 weeks.  He injured it while wrestling around with a friend.  He notes pain is worse when he does certain movements like reach where his seatbelt buckle over his left shoulder.  He is right-hand dominant.  No neurovascular compromise noted on exam.  Moves his fingers normally.  No swelling of the joint, erythema or other worrisome findings.  X-rays negative.  Discussed soft tissue injury possibilities.  He was given a new wrist splint.  Recommended if not improved to follow-up with orthopedics.  Return with any new or worrisome symptoms.      Diagnosis:    ICD-10-CM    1. Wrist injury, left, initial encounter  S69.92XA              Scribe Disclosure:  I, Clemente Trinidad, am serving as a scribe at 1:32 PM on 4/24/2024 to document services personally performed by Dean Sandy MD based on my observations and the provider's statements to me.   4/24/2024   Dean Sandy MD [place], Aaron Joseph, MD  04/24/24 5033

## 2024-05-07 PROBLEM — F41.1 GAD (GENERALIZED ANXIETY DISORDER): Status: ACTIVE | Noted: 2020-01-23

## 2024-05-24 ENCOUNTER — VIRTUAL VISIT (OUTPATIENT)
Dept: FAMILY MEDICINE | Facility: CLINIC | Age: 29
End: 2024-05-24
Payer: COMMERCIAL

## 2024-05-24 DIAGNOSIS — F90.9 ATTENTION DEFICIT HYPERACTIVITY DISORDER (ADHD), UNSPECIFIED ADHD TYPE: ICD-10-CM

## 2024-05-24 DIAGNOSIS — F43.10 PTSD (POST-TRAUMATIC STRESS DISORDER): Primary | ICD-10-CM

## 2024-05-24 PROCEDURE — 99214 OFFICE O/P EST MOD 30 MIN: CPT | Mod: 95 | Performed by: FAMILY MEDICINE

## 2024-05-24 RX ORDER — GABAPENTIN 600 MG/1
600 TABLET ORAL 3 TIMES DAILY
Qty: 90 TABLET | Refills: 1 | Status: CANCELLED | OUTPATIENT
Start: 2024-05-24

## 2024-05-24 RX ORDER — LISDEXAMFETAMINE DIMESYLATE 40 MG/1
40 CAPSULE ORAL EVERY MORNING
Qty: 30 CAPSULE | Refills: 0 | Status: SHIPPED | OUTPATIENT
Start: 2024-05-28 | End: 2024-07-21

## 2024-05-24 RX ORDER — DEXTROAMPHETAMINE SACCHARATE, AMPHETAMINE ASPARTATE, DEXTROAMPHETAMINE SULFATE AND AMPHETAMINE SULFATE 1.25; 1.25; 1.25; 1.25 MG/1; MG/1; MG/1; MG/1
5 TABLET ORAL DAILY
Qty: 30 TABLET | Refills: 0 | Status: SHIPPED | OUTPATIENT
Start: 2024-05-28 | End: 2024-07-21

## 2024-05-24 RX ORDER — LORAZEPAM 1 MG/1
.5-1 TABLET ORAL DAILY PRN
Qty: 18 TABLET | Refills: 0 | Status: SHIPPED | OUTPATIENT
Start: 2024-05-28 | End: 2024-07-23

## 2024-05-24 NOTE — PROGRESS NOTES
"Assessment/Plan - Phone Appointment.    Anxiety, depression.  Reports feeling hopeful.  Challenging past month with relationship and housing transitions.  -continue gabapentin  -continue lorazepam prn at 18 tabs/month. [place] prescription monitoring database reviewed today    ADHD.  -continue Vyvanse and Adderall with no dose change  -given recent increased interest in \"conspiracy theories\" (Will's own words), e.g., regarding aliens and Rothschilds, will need to monitor stimulant use closely    F/u in 1 month.      ----  Chief complaint: Recheck Medication    Social History     Social History Narrative    -[place] up on [facility]    -Lives with friend    -    -Has 4 kids, 3 with ex-partner. In custody avendano with ex-partner    -Works as [employer].  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 No longer living together.    [place].    Now staying w/ friend. Feels safe there.    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    Trying to be more spiritual. Reading Bible. Readings about holistic healing, placebo effect, energy frequencies.    Interactions w/ Ekaterina remain a challenge. She is mean. She threw away some of his possessions. She is dating other people.    Will would like to maintain contact with youngest daughter, Giorgio.    Feeling hopeful overall.    Seeing therapist weekly.    Denies recent med changes. Ran out of meds for a while when moving. Now taking them again. Denies adverse effect concerns.    Cutting down on substance use. Less weed, EtOH. Denies recent illicit drug use. Less sex.    Patient verbally consented to telehealth visit.  Location of patient: [place]. Location of provider: [place].  Start time of conversation: 7.40am. End time of conversation: 8.07am.  Billing based on complexity of encounter.  "

## 2024-07-06 ENCOUNTER — HEALTH MAINTENANCE LETTER (OUTPATIENT)
Age: 29
End: 2024-07-06

## 2024-08-09 ENCOUNTER — OFFICE VISIT (OUTPATIENT)
Dept: FAMILY MEDICINE | Facility: CLINIC | Age: 29
End: 2024-08-09
Payer: COMMERCIAL

## 2024-08-09 VITALS
RESPIRATION RATE: 14 BRPM | WEIGHT: 145.5 LBS | BODY MASS INDEX: 19.28 KG/M2 | TEMPERATURE: 97.8 F | OXYGEN SATURATION: 99 % | HEIGHT: 73 IN | HEART RATE: 63 BPM | SYSTOLIC BLOOD PRESSURE: 119 MMHG | DIASTOLIC BLOOD PRESSURE: 73 MMHG

## 2024-08-09 DIAGNOSIS — F43.10 PTSD (POST-TRAUMATIC STRESS DISORDER): Primary | ICD-10-CM

## 2024-08-09 DIAGNOSIS — F90.9 ATTENTION DEFICIT HYPERACTIVITY DISORDER (ADHD), UNSPECIFIED ADHD TYPE: ICD-10-CM

## 2024-08-09 PROCEDURE — 99214 OFFICE O/P EST MOD 30 MIN: CPT | Performed by: FAMILY MEDICINE

## 2024-08-09 RX ORDER — LORAZEPAM 1 MG/1
.5-1 TABLET ORAL DAILY PRN
Qty: 16 TABLET | Refills: 0 | Status: SHIPPED | OUTPATIENT
Start: 2024-08-09

## 2024-08-09 RX ORDER — LISDEXAMFETAMINE DIMESYLATE 40 MG/1
40 CAPSULE ORAL EVERY MORNING
Qty: 30 CAPSULE | Refills: 0 | Status: SHIPPED | OUTPATIENT
Start: 2024-08-21

## 2024-08-09 RX ORDER — DEXTROAMPHETAMINE SACCHARATE, AMPHETAMINE ASPARTATE, DEXTROAMPHETAMINE SULFATE AND AMPHETAMINE SULFATE 1.25; 1.25; 1.25; 1.25 MG/1; MG/1; MG/1; MG/1
5 TABLET ORAL DAILY
Qty: 30 TABLET | Refills: 0 | Status: SHIPPED | OUTPATIENT
Start: 2024-08-21

## 2024-08-09 ASSESSMENT — ANXIETY QUESTIONNAIRES
5. BEING SO RESTLESS THAT IT IS HARD TO SIT STILL: NOT AT ALL
6. BECOMING EASILY ANNOYED OR IRRITABLE: SEVERAL DAYS
GAD7 TOTAL SCORE: 2
1. FEELING NERVOUS, ANXIOUS, OR ON EDGE: NOT AT ALL
4. TROUBLE RELAXING: NOT AT ALL
7. FEELING AFRAID AS IF SOMETHING AWFUL MIGHT HAPPEN: SEVERAL DAYS
3. WORRYING TOO MUCH ABOUT DIFFERENT THINGS: NOT AT ALL
2. NOT BEING ABLE TO STOP OR CONTROL WORRYING: NOT AT ALL
7. FEELING AFRAID AS IF SOMETHING AWFUL MIGHT HAPPEN: SEVERAL DAYS

## 2024-08-09 ASSESSMENT — ASTHMA QUESTIONNAIRES
ACT_TOTALSCORE: 25
QUESTION_3 LAST FOUR WEEKS HOW OFTEN DID YOUR ASTHMA SYMPTOMS (WHEEZING, COUGHING, SHORTNESS OF BREATH, CHEST TIGHTNESS OR PAIN) WAKE YOU UP AT NIGHT OR EARLIER THAN USUAL IN THE MORNING: NOT AT ALL
QUESTION_1 LAST FOUR WEEKS HOW MUCH OF THE TIME DID YOUR ASTHMA KEEP YOU FROM GETTING AS MUCH DONE AT WORK, SCHOOL OR AT HOME: NONE OF THE TIME
ACT_TOTALSCORE: 25
QUESTION_5 LAST FOUR WEEKS HOW WOULD YOU RATE YOUR ASTHMA CONTROL: COMPLETELY CONTROLLED
QUESTION_4 LAST FOUR WEEKS HOW OFTEN HAVE YOU USED YOUR RESCUE INHALER OR NEBULIZER MEDICATION (SUCH AS ALBUTEROL): NOT AT ALL
QUESTION_2 LAST FOUR WEEKS HOW OFTEN HAVE YOU HAD SHORTNESS OF BREATH: NOT AT ALL

## 2024-08-09 ASSESSMENT — PATIENT HEALTH QUESTIONNAIRE - PHQ9
10. IF YOU CHECKED OFF ANY PROBLEMS, HOW DIFFICULT HAVE THESE PROBLEMS MADE IT FOR YOU TO DO YOUR WORK, TAKE CARE OF THINGS AT HOME, OR GET ALONG WITH OTHER PEOPLE: NOT DIFFICULT AT ALL
SUM OF ALL RESPONSES TO PHQ QUESTIONS 1-9: 5
SUM OF ALL RESPONSES TO PHQ QUESTIONS 1-9: 5

## 2024-08-09 ASSESSMENT — PAIN SCALES - GENERAL: PAINLEVEL: NO PAIN (0)

## 2024-08-09 NOTE — PROGRESS NOTES
"  {PROVIDER CHARTING PREFERENCE:332113}    Subjective   Will is a 29 year old, presenting for the following health issues:  Recheck Medication      8/9/2024    11:43 AM   Additional Questions   Roomed by Rena Pierre         8/9/2024    11:44 AM   Patient Reported Additional Medications   Patient reports taking the following new medications hemp seed oil     History of Present Illness       Back Pain:  He presents for follow up of back pain. Patient's back pain is a recurring problem.  Location of back pain:  Right lower back, left lower back, right middle of back and left middle of back  Description of back pain: dull ache  Back pain spreads: left shoulder and right side of neck    Since patient first noticed back pain, pain is: gradually improving  Does back pain interfere with his job:  No       Mental Health Follow-up:  Patient presents to follow-up on Anxiety.    Patient's anxiety since last visit has been:  Better  The patient is not having other symptoms associated with anxiety.  Any significant life events: relationship concerns, job concerns, financial concerns, housing concerns and health concerns  Patient is feeling anxious or having panic attacks.  Patient has no concerns about alcohol or drug use.    He eats 2-3 servings of fruits and vegetables daily.He consumes 2 sweetened beverage(s) daily.He exercises with enough effort to increase his heart rate 20 to 29 minutes per day.  He exercises with enough effort to increase his heart rate 4 days per week. He is missing 2 dose(s) of medications per week.  He is not taking prescribed medications regularly due to remembering to take.       {MA/LPN/RN Pre-Provider Visit Orders- hCG/UA/Strep (Optional):732876}  {SUPERLIST (Optional):621945}  {additonal problems for provider to add (Optional):924139}    {ROS Picklists (Optional):561108}      Objective    /73   Pulse 63   Temp 97.8  F (36.6  C) (Temporal)   Resp 14   Ht 1.845 m (6' 0.64\")   Wt 66 kg " (145 lb 8 oz)   SpO2 99%   BMI 19.39 kg/m    Body mass index is 19.39 kg/m .  Physical Exam   {Exam List (Optional):058913}    {Diagnostic Test Results (Optional):454456}        Signed Electronically by: Bert Rangel MD  {Email feedback regarding this note to [email or web address]   :[id number]}

## 2024-08-09 NOTE — PROGRESS NOTES
Assessment/Plan.    Anxiety, depression.  Patient feels that he is in a good emotional state.  -Continue gabapentin 600 mg 3 times daily as needed  -Continue lorazepam 0.5 to 1 mg daily as needed.  Will continue taper.  Will decrease from 18 tablets to 16 tablets/month  -[place] prescription monitoring database reviewed today    ADHD.  -continue Vyvanse and Adderall with no dose change.  -In the past couple visits, patient has demonstrated somewhat more schizotypal behavior.  Will need to monitor stimulant use closely  -Reviewed w/ Will that due to prescription of controlled substances, will need to check urine drug screen at least annually. 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    Weight loss. Suspect 2/2 food insecurity.  -discuss at next visit    F/u in 1 month.      ----  Chief complaint: Recheck Medication    Social History     Social History Narrative    -[place] on [address] [address] [facility]    -Lives with mother of youngest child    -    -Has 4 kids, 3 with ex-partner. Youngest daughter is Giorgio (born in 2024). 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  Daughter, Giorgio, is now 6 months old.  Daughter is doing well.  Relationship between patient and Ekaterina remains strained.    Emotions are good overall.  Patient has been doing a lot of self-reflection and studying various schools of philosophy.  Also meditating.    Taking gabapentin and lorazepam as needed for anxiety.    Taking Vyvanse and Adderall consistently.  Denies taking more of these medications than prescribed.    Patient has been increasingly questioning the Western model of medicine.  He hopes to be off medication by next spring.    Used LSD a few  "months ago.  Using cannabis, but trying to quit in order to pass DOT exam.  Drinks alcohol about 2 times per month.    Exam  /73   Pulse 63   Temp 97.8  F (36.6  C) (Temporal)   Resp 14   Ht 1.845 m (6' 0.64\")   Wt 66 kg (145 lb 8 oz)   SpO2 99%   BMI 19.39 kg/m      Wt Readings from Last 5 Encounters:   08/09/24 66 kg (145 lb 8 oz)   04/24/24 70.3 kg (155 lb)   01/18/24 74.6 kg (164 lb 8 oz)   12/20/23 74.4 kg (164 lb)   06/12/23 74.6 kg (164 lb 8 oz)     Affect normal.  Thought process somewhat more tangential today.  Makes frequent references to distrust of Western medical system and belief in collective consciousness.  "

## 2024-08-10 RX ORDER — GABAPENTIN 600 MG/1
600 TABLET ORAL 3 TIMES DAILY PRN
COMMUNITY
Start: 2024-08-10

## 2024-10-09 ENCOUNTER — OFFICE VISIT (OUTPATIENT)
Dept: FAMILY MEDICINE | Facility: CLINIC | Age: 29
End: 2024-10-09

## 2024-10-09 DIAGNOSIS — F33.1 MODERATE EPISODE OF RECURRENT MAJOR DEPRESSIVE DISORDER (H): ICD-10-CM

## 2024-10-09 DIAGNOSIS — F90.9 ATTENTION DEFICIT HYPERACTIVITY DISORDER (ADHD), UNSPECIFIED ADHD TYPE: Primary | ICD-10-CM

## 2024-10-09 DIAGNOSIS — F41.1 GAD (GENERALIZED ANXIETY DISORDER): ICD-10-CM

## 2024-10-09 DIAGNOSIS — F43.10 PTSD (POST-TRAUMATIC STRESS DISORDER): ICD-10-CM

## 2024-10-09 PROCEDURE — 99214 OFFICE O/P EST MOD 30 MIN: CPT | Performed by: FAMILY MEDICINE

## 2024-10-09 RX ORDER — LORAZEPAM 1 MG/1
.5-1 TABLET ORAL DAILY PRN
Qty: 16 TABLET | Refills: 0 | Status: SHIPPED | OUTPATIENT
Start: 2024-10-09

## 2024-10-09 ASSESSMENT — PATIENT HEALTH QUESTIONNAIRE - PHQ9
SUM OF ALL RESPONSES TO PHQ QUESTIONS 1-9: 10
SUM OF ALL RESPONSES TO PHQ QUESTIONS 1-9: 10
10. IF YOU CHECKED OFF ANY PROBLEMS, HOW DIFFICULT HAVE THESE PROBLEMS MADE IT FOR YOU TO DO YOUR WORK, TAKE CARE OF THINGS AT HOME, OR GET ALONG WITH OTHER PEOPLE: VERY DIFFICULT

## 2024-10-09 ASSESSMENT — ANXIETY QUESTIONNAIRES
6. BECOMING EASILY ANNOYED OR IRRITABLE: NEARLY EVERY DAY
1. FEELING NERVOUS, ANXIOUS, OR ON EDGE: MORE THAN HALF THE DAYS
IF YOU CHECKED OFF ANY PROBLEMS ON THIS QUESTIONNAIRE, HOW DIFFICULT HAVE THESE PROBLEMS MADE IT FOR YOU TO DO YOUR WORK, TAKE CARE OF THINGS AT HOME, OR GET ALONG WITH OTHER PEOPLE: VERY DIFFICULT
GAD7 TOTAL SCORE: 13
7. FEELING AFRAID AS IF SOMETHING AWFUL MIGHT HAPPEN: NOT AT ALL
8. IF YOU CHECKED OFF ANY PROBLEMS, HOW DIFFICULT HAVE THESE MADE IT FOR YOU TO DO YOUR WORK, TAKE CARE OF THINGS AT HOME, OR GET ALONG WITH OTHER PEOPLE?: VERY DIFFICULT
4. TROUBLE RELAXING: NEARLY EVERY DAY
3. WORRYING TOO MUCH ABOUT DIFFERENT THINGS: MORE THAN HALF THE DAYS
GAD7 TOTAL SCORE: 13
5. BEING SO RESTLESS THAT IT IS HARD TO SIT STILL: SEVERAL DAYS
7. FEELING AFRAID AS IF SOMETHING AWFUL MIGHT HAPPEN: NOT AT ALL
2. NOT BEING ABLE TO STOP OR CONTROL WORRYING: MORE THAN HALF THE DAYS
GAD7 TOTAL SCORE: 13

## 2024-10-09 NOTE — PROGRESS NOTES
Assessment/Plan.    Anxiety, depression, ptsd, adhd, possible borderline personality traits. Symptoms are poorly controlled. Life stressors remain a challenge.  -encouraged Will to attend therapy more consistently as able  -Will interested in iop. I think this could be very helpful. Appreciate  assistance with helping to arrange  -continue vyvanse 40 mg/morning. Should be previous prescription available at pharmacy. Will may not be able to fill until insurance is reactivated  -continue adderall ir 5 mg/afternoon as needed. Should be previous prescription available at pharmacy. Will may not be able to fill until insurance is reactivated  -continue lorazepam 0.5 to 1 mg daily as needed. 16 tablets/month. I hope to taper monthly supply further in near future.  Will may not be able to fill until insurance is reactivated    F/u in 1 month.      ----  Chief complaint: Recheck Medication    Social History     Social History Narrative    -[place] on [facility] [facility]    -Lives with mother of youngest child    -    -Has 4 kids, 3 with ex-partner. Youngest daughter is Giorgio (born in 2024). In custody avendano with ex-partner    -Works as [employer].  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        Updated 8/10/2024     Life remains challenging    Insurance lapsed  Needs to complete application, but papers are with sister up north    Started online undergraduate program at [employer]  Regenerative organic agriculture    Friend passed away    Thinks that MOB, Ekaterina, has been cheating on him  Feels that she invites others to apartment when he's not around  Becomes anxious when Ekaterina doesn't answer phone      -reminds him of his mother when he was a kid, mother has substance use disorder    Wants to leave Ekaterina, but worries that infant daughter, Giorgio, won't get adequate care  Giorgio has severe eczema    Denies recent thoughts of ending life  Feeling more  hopeless  Feels need for more support  Did outpt program at [facility] in past    Cannabis daily  EtOH used 1 time since last visit  Occasional mushroom use  Smoking more cigarettes lately    Still doing therapy, but missed appts, so now has to call-in ahead of time to check availability    Out of meds, except for lorazepam, for past month    Exam  There were no vitals taken for this visit.    Affect range normal.  Tearful at times.

## 2024-10-09 NOTE — PATIENT INSTRUCTIONS
I will ask our mental health , Rowena, to call you.    Refills today. Will keep meds the same. Might not be able to send in prescriptions until lunch.    Think about trying Wellbutrin. Helps with both depression and ADHD.  -not a stimulant  -has some properties similar to stimulants  -typically taken in morning  -may take 2 weeks to start to appreciate effects, may take 6 weeks to achieve peak effect  -needs to be taken daily, otherwise you could experience withdrawal symptoms  -may cause decreased appetite, difficulty falling asleep, mood changes (many folks feel less anxious and less depressed, but some folks feel more anxious)  -not a controlled substance, not addictive    Let's meet again in a month.

## 2024-10-10 ENCOUNTER — TELEPHONE (OUTPATIENT)
Dept: FAMILY MEDICINE | Facility: CLINIC | Age: 29
End: 2024-10-10

## 2024-10-10 ENCOUNTER — PATIENT OUTREACH (OUTPATIENT)
Dept: CARE COORDINATION | Facility: CLINIC | Age: 29
End: 2024-10-10

## 2024-10-10 DIAGNOSIS — Z71.89 OTHER SPECIFIED COUNSELING: Primary | Chronic | ICD-10-CM

## 2024-10-10 NOTE — PROGRESS NOTES
Clinic Care Coordination Contact  [facility]/Voicemail    Chart Review: PCP order from Dr. Rangel on 10/10/24:  Mental wellness - resources of behavioral health service  Patient/caregiver support - havigation of long term care/[place] services  Comments - interested in mental health IOP. Also needs help re-activating his insurance. Thanks.    9/18/24 - case management visit note in EPIC from [facility] written by Isaias Freeman. States Isaias is helping pt with a SNAP application, also pt is coming back to speak with PA regarding housing. Under pt contact, note pt has , guardian.    CHW called [facility] ([phone]) and spoke with medical clinic representative. Requested to speak with Isaias Freeman, housing advocate. Rep states pt is not in today, but transferred me to a phone for Isaias ([phone]). CHW left a VM requesting a return phone call. No PHI was left during conversations/VM with the [facility].    CHW [place] - [place]      Clinical Data: Care Coordinator Outreach    Outreach Documentation Number of Outreach Attempt   10/10/2024   1:53 PM 1       Left message on patient's voicemail with call back information and requested return call.    Plan: Care Coordinator will try to reach patient again in 1-2 business days.    Loni Solis  Community Health Worker  [facility]  [phone]

## 2024-10-10 NOTE — TELEPHONE ENCOUNTER
Pt calling regarding is medical opinion form. Pt stating that it was given in clinic yesterday at his appointment. Thanks    Noemy Lopez RN  [facility]

## 2024-10-10 NOTE — LETTER
[facility] C[facility] COORDINATION  [address]  [place] [address]    October 11, 2024    Hernandez Araujo Jose  [address]  [place] [address]      Dear Will,    I am a clinic community health worker who works with Bert Rangel MD with the [facility]. I wanted to introduce myself and provide you with my contact information for you to be able to call me with any questions or concerns. Below is a description of clinic care coordination and how I can further assist you.       The clinic care coordination team is made up of a registered nurse, , financial resource worker and community health worker who understand the health care system. The goal of clinic care coordination is to help you manage your health and improve access to the health care system. Our team works alongside your provider to assist you in determining your health and social needs. We can help you obtain health care and community resources, providing you with necessary information and education. We can work with you through any barriers and develop a care plan that helps coordinate and strengthen the communication between you and your care team.  Our services are voluntary and are offered without charge to you personally.    Please feel free to contact me with any questions or concerns regarding care coordination and what we can offer.      We are focused on providing you with the highest-quality healthcare experience possible.      Sincerely,     Loni Solis  Community Health Worker  [facility]  [phone]

## 2024-10-10 NOTE — TELEPHONE ENCOUNTER
Called patient to see if he wants to  form or mail, fax.    Did advised to call clinic back with next steps

## 2024-10-10 NOTE — TELEPHONE ENCOUNTER
My memory is that we discussed form, but he didn't have form with him. I'll print out a blank one and have it completed soon. Thanks.

## 2024-10-11 ENCOUNTER — PATIENT OUTREACH (OUTPATIENT)
Dept: CARE COORDINATION | Facility: CLINIC | Age: 29
End: 2024-10-11

## 2024-10-11 NOTE — PROGRESS NOTES
Clinic Care Coordination Contact  [facility]/Voicemail      Chart Review: PCP order from Dr. Rangel on 10/10/24:  Mental wellness - resources of behavioral health service  Patient/caregiver support - havigation of long term care/[facility] services  Comments - interested in mental health IOP. Also needs help re-activating his insurance. Thanks.     9/18/24 - case management visit note in EPIC from [facility] written by Isaias Freeman. States Isaias is helping pt with a SNAP application, also pt is coming back to speak with PA regarding housing. Under pt contact, note pt has , guardian.     CHW called [facility] ([phone]) and spoke with medical clinic representative. Requested to speak with Isaias Freeman, housing advocate. Rep states pt is not in today, but transferred me to a phone for Isaias ([phone]). CHW left a VM requesting a return phone call. No PHI was left during conversations/VM with the [facility].     [place]      Clinical Data: Care Coordinator Outreach    Outreach Documentation Number of Outreach Attempt   10/10/2024   1:53 PM 1   10/11/2024   1:50 PM 2       Left message on patient's voicemail with call back information and requested return call.    Plan: Care Coordinator will send care coordination introduction letter with care coordinator contact information and explanation of care coordination services via [employer]. Care Coordinator will do no further outreaches at this time.    Loni Solis  Community Health Worker  [facility]  [phone]

## 2024-10-13 PROBLEM — F33.9 RECURRENT MAJOR DEPRESSION (H): Status: ACTIVE | Noted: 2020-10-06

## 2024-10-15 ENCOUNTER — PATIENT OUTREACH (OUTPATIENT)
Dept: CARE COORDINATION | Facility: CLINIC | Age: 29
End: 2024-10-15

## 2024-11-19 ENCOUNTER — PATIENT OUTREACH (OUTPATIENT)
Dept: CARE COORDINATION | Facility: CLINIC | Age: 29
End: 2024-11-19

## 2025-07-13 ENCOUNTER — HEALTH MAINTENANCE LETTER (OUTPATIENT)
Age: 30
End: 2025-07-13